# Patient Record
Sex: FEMALE | Race: OTHER | HISPANIC OR LATINO | Employment: FULL TIME | URBAN - METROPOLITAN AREA
[De-identification: names, ages, dates, MRNs, and addresses within clinical notes are randomized per-mention and may not be internally consistent; named-entity substitution may affect disease eponyms.]

---

## 2018-03-13 ENCOUNTER — TELEPHONE (OUTPATIENT)
Dept: OBGYN CLINIC | Facility: CLINIC | Age: 38
End: 2018-03-13

## 2018-03-20 ENCOUNTER — OFFICE VISIT (OUTPATIENT)
Dept: OBGYN CLINIC | Facility: MEDICAL CENTER | Age: 38
End: 2018-03-20
Payer: COMMERCIAL

## 2018-03-20 VITALS
WEIGHT: 129 LBS | SYSTOLIC BLOOD PRESSURE: 100 MMHG | HEIGHT: 61 IN | DIASTOLIC BLOOD PRESSURE: 62 MMHG | BODY MASS INDEX: 24.35 KG/M2

## 2018-03-20 DIAGNOSIS — N91.2 AMENORRHEA: Primary | ICD-10-CM

## 2018-03-20 PROBLEM — B97.7 HPV IN FEMALE: Status: ACTIVE | Noted: 2018-03-20

## 2018-03-20 PROBLEM — B00.9 HERPES SIMPLEX VIRUS INFECTION: Status: ACTIVE | Noted: 2018-03-20

## 2018-03-20 PROCEDURE — 99213 OFFICE O/P EST LOW 20 MIN: CPT | Performed by: NURSE PRACTITIONER

## 2018-03-20 PROCEDURE — 76817 TRANSVAGINAL US OBSTETRIC: CPT | Performed by: NURSE PRACTITIONER

## 2018-03-20 NOTE — PROGRESS NOTES
EARLY PREGNANCY ULTRASOUND    SUBJECTIVE    HPI: Beto Comer is a 40 y o  [id number] ( x1, SAB x1) new patient female here today for early pregnancy ultrasound  She is accompanied by [facility], Magdalene Griffiths  They have been TTC >2y and did see [facility] [place] but it sounds like they did not engage in treatment there  Patient's last menstrual period was 01/15/2018 (within weeks)  Menses are regular  This pregnancy was planned  PMHx is significant for total R hip replacement due to ?arthritis  OBHx is significant for  x1, 16y ago (denies complications with this pregnancy or delivery)  Denies a family history of birth defects or intellectual defects  Did have varicella as a child  Taking a prenatal vitamin  Works as a   Reports a hx of (+) HPV but denies abnormal pap cytology  Also reports a (+) hx of HSV  No Known Allergies    Current Outpatient Prescriptions:     Prenatal Vit-Fe Fumarate-FA (PRENATAL FA PO), Take by mouth, Disp: , Rfl:       OBJECTIVE  Vitals:    18 0750   BP: 100/62   BP Location: Left arm   Patient Position: Sitting   Weight: 58 5 kg (129 lb)   Height: 5' 1" (1 549 m)         Early OB Ultrasound Procedure Note: Transvaginal US    Technician: Study performed by the interpreting NP    Indications:  Early gestation, dating & viability    Procedure Details   The entire study was done at settings of 6 0 to 8 0 MHz  Gestational Sac: Present  MSD = 1 29cm (=5w3d)  Yolk sac: Present  Crown-rump length is 0 21cm and calculates to an estimated gestational age of 10 weeks, 5 days  Embryonic cardiac activity is not seen      Cul-de-sac: no fluid  Left ovary: appears normal  Right ovary: appears normal  Description of uterus: anteverted  Description of cervix: Sizable nabothian cyst, otherwise appears normal    Findings:  Early solorzano intrauterine pregnancy          ASSESSMENT  Early pregnancy at 5 weeks 5 days  Unreliable LMP, favor dating based on [address]  Long interpregnancy interval  Advanced maternal age  Secondary Infertility        PLAN  1 - RTO in 2 weeks for repeat dating & viability scan  Discussed today's US findings, which are not concordant with what would be expected based on LMP but may be considered normal if there is appropriate interval growth between now and next scan  2 - If viable pregnancy, make note of problem list: long IPI, AMA, HSV, secondary infertility        All questions were answered  Elenita Velázquez expressed understanding      Latanya Christine

## 2018-04-05 ENCOUNTER — OFFICE VISIT (OUTPATIENT)
Dept: OBGYN CLINIC | Facility: MEDICAL CENTER | Age: 38
End: 2018-04-05
Payer: COMMERCIAL

## 2018-04-05 VITALS — WEIGHT: 131 LBS | DIASTOLIC BLOOD PRESSURE: 60 MMHG | SYSTOLIC BLOOD PRESSURE: 100 MMHG | BODY MASS INDEX: 24.75 KG/M2

## 2018-04-05 DIAGNOSIS — O09.521 ADVANCED MATERNAL AGE IN MULTIGRAVIDA, FIRST TRIMESTER: ICD-10-CM

## 2018-04-05 DIAGNOSIS — N91.2 AMENORRHEA: Primary | ICD-10-CM

## 2018-04-05 PROCEDURE — 99213 OFFICE O/P EST LOW 20 MIN: CPT | Performed by: NURSE PRACTITIONER

## 2018-04-05 PROCEDURE — 76817 TRANSVAGINAL US OBSTETRIC: CPT | Performed by: NURSE PRACTITIONER

## 2018-04-05 NOTE — PROGRESS NOTES
EARLY PREGNANCY ULTRASOUND    SUBJECTIVE    HPI: Randall Ramos is a 40 y o  [id number] female who returns today for repeat pregnancy ultrasound  Accompanied by FOB, Vannessa Gan I last saw Jodiene Lizbet on 3/20 for early ultrasound, at which time an IUP with fetal pole was seen and measuring approx 5w3d  The couple returns today for viability & dating scan  LMP is unreliable  In the interim, she has been doing well with no vaginal bleeding and persistent nausea  OBJECTIVE  Vitals:    04/05/18 1559   BP: 100/60   BP Location: Left arm   Patient Position: Sitting   Weight: 59 4 kg (131 lb)         Early OB Ultrasound Procedure Note: Transvaginal US    Technician: Study performed by the interpreting NP    Indications:  Early gestation, dating & viability    Procedure Details   The entire study was done at settings of 6 0 to 8 0 MHz  Gestational Sac: Present  Yolk sac: Present  Crown-rump length is 1 72cm and calculates to an estimated gestational age of 11 weeks, 1 days  Embryonic cardiac activity is seen at a rate of 160 b/min  Description of fetal anatomy Normal, +limb buds    Cul-de-sac: no fluid  Left ovary: appears noraml  Right ovary: appears normal  Description of uterus: anteverted  Description of cervix: Sizable nabothian cyst, otherwise appears normal    Findings:  Viable, solorzano intrauterine pregnancy      ASSESSMENT  Early pregnancy at 8 weeks 1 days with a calculated ROBE of 11/14/2018 based on [address]  1 - Referral to [facility] provided today to discuss genetic screening options for fetus  2 - RTO for OB interview and PN-1 visit  3 - Valtrex suppression starting @ 36 WGA          All questions were answered & Rosalia Somers expressed understanding      Ramy Hernandez

## 2018-04-16 ENCOUNTER — INITIAL PRENATAL (OUTPATIENT)
Dept: OBGYN CLINIC | Facility: MEDICAL CENTER | Age: 38
End: 2018-04-16

## 2018-04-16 ENCOUNTER — APPOINTMENT (OUTPATIENT)
Dept: LAB | Facility: MEDICAL CENTER | Age: 38
End: 2018-04-16
Payer: COMMERCIAL

## 2018-04-16 VITALS
WEIGHT: 132 LBS | BODY MASS INDEX: 24.92 KG/M2 | DIASTOLIC BLOOD PRESSURE: 70 MMHG | RESPIRATION RATE: 14 BRPM | SYSTOLIC BLOOD PRESSURE: 110 MMHG | HEIGHT: 61 IN

## 2018-04-16 DIAGNOSIS — Z34.91 FIRST TRIMESTER PREGNANCY: ICD-10-CM

## 2018-04-16 DIAGNOSIS — Z34.91 FIRST TRIMESTER PREGNANCY: Primary | ICD-10-CM

## 2018-04-16 DIAGNOSIS — O09.521 ADVANCED MATERNAL AGE IN MULTIGRAVIDA, FIRST TRIMESTER: ICD-10-CM

## 2018-04-16 LAB
ABO GROUP BLD: NORMAL
BACTERIA UR QL AUTO: NORMAL /HPF
BASOPHILS # BLD AUTO: 0.02 THOUSANDS/ΜL (ref 0–0.1)
BASOPHILS NFR BLD AUTO: 0 % (ref 0–1)
BILIRUB UR QL STRIP: NEGATIVE
BLD GP AB SCN SERPL QL: NEGATIVE
CLARITY UR: CLEAR
COLOR UR: YELLOW
EOSINOPHIL # BLD AUTO: 0.07 THOUSAND/ΜL (ref 0–0.61)
EOSINOPHIL NFR BLD AUTO: 1 % (ref 0–6)
ERYTHROCYTE [DISTWIDTH] IN BLOOD BY AUTOMATED COUNT: 13.5 % (ref 11.6–15.1)
FINE GRAN CASTS URNS QL MICRO: NORMAL /LPF
GLUCOSE UR STRIP-MCNC: NEGATIVE MG/DL
HCT VFR BLD AUTO: 40.2 % (ref 34.8–46.1)
HGB BLD-MCNC: 13.5 G/DL (ref 11.5–15.4)
HGB UR QL STRIP.AUTO: NEGATIVE
KETONES UR STRIP-MCNC: NEGATIVE MG/DL
LEUKOCYTE ESTERASE UR QL STRIP: NEGATIVE
LYMPHOCYTES # BLD AUTO: 2.09 THOUSANDS/ΜL (ref 0.6–4.47)
LYMPHOCYTES NFR BLD AUTO: 22 % (ref 14–44)
MCH RBC QN AUTO: 29.9 PG (ref 26.8–34.3)
MCHC RBC AUTO-ENTMCNC: 33.6 G/DL (ref 31.4–37.4)
MCV RBC AUTO: 89 FL (ref 82–98)
MONOCYTES # BLD AUTO: 0.8 THOUSAND/ΜL (ref 0.17–1.22)
MONOCYTES NFR BLD AUTO: 8 % (ref 4–12)
NEUTROPHILS # BLD AUTO: 6.67 THOUSANDS/ΜL (ref 1.85–7.62)
NEUTS SEG NFR BLD AUTO: 69 % (ref 43–75)
NITRITE UR QL STRIP: NEGATIVE
NON-SQ EPI CELLS URNS QL MICRO: NORMAL /HPF
NRBC BLD AUTO-RTO: 0 /100 WBCS
PH UR STRIP.AUTO: 6.5 [PH] (ref 4.5–8)
PLATELET # BLD AUTO: 314 THOUSANDS/UL (ref 149–390)
PMV BLD AUTO: 9.8 FL (ref 8.9–12.7)
PROT UR STRIP-MCNC: NEGATIVE MG/DL
RBC # BLD AUTO: 4.51 MILLION/UL (ref 3.81–5.12)
RBC #/AREA URNS AUTO: NORMAL /HPF
RH BLD: POSITIVE
SP GR UR STRIP.AUTO: 1.02 (ref 1–1.03)
SPECIMEN EXPIRATION DATE: NORMAL
UROBILINOGEN UR QL STRIP.AUTO: 0.2 E.U./DL
WBC # BLD AUTO: 9.68 THOUSAND/UL (ref 4.31–10.16)
WBC #/AREA URNS AUTO: NORMAL /HPF

## 2018-04-16 PROCEDURE — 36415 COLL VENOUS BLD VENIPUNCTURE: CPT

## 2018-04-16 PROCEDURE — 80081 OBSTETRIC PANEL INC HIV TSTG: CPT

## 2018-04-16 PROCEDURE — 81001 URINALYSIS AUTO W/SCOPE: CPT

## 2018-04-16 PROCEDURE — 87186 SC STD MICRODIL/AGAR DIL: CPT

## 2018-04-16 PROCEDURE — 87086 URINE CULTURE/COLONY COUNT: CPT

## 2018-04-16 PROCEDURE — 87077 CULTURE AEROBIC IDENTIFY: CPT

## 2018-04-16 PROCEDURE — OBC: Performed by: OBSTETRICS & GYNECOLOGY

## 2018-04-16 NOTE — PROGRESS NOTES
Patient came to the OB intake with her  Stephanie Alegre they reports that this was a planned pregnancy  Patient reports that this is her 3rd pregnancy  Her last delivery was 16 years ago she had 1 miscarriage  in 2006 she has a D&C she really did not want to speak about that pregnancy she did not remember how far she was her eyes were starting to get watery and she prefer not to remember  Patient and her  are very happy with the pregnancy   Patient is interested in doing Genetic testing she has appointment for the Maternal Fetal Medicine on May 7th at the [facility]  Patient reports that she does not have any cats in the house she reports that she had the chickenpox a an early age   Patient has a history of herpes valtrex suppression starting at 36 weeks gestation  Patient had plans to travel to [place] but she change her plans due to the [place] virus her  has some concerns and refuses to travel during his wife pregnancy  Patient is planning to do her blood work after her Ob intake visit

## 2018-04-17 ENCOUNTER — TELEPHONE (OUTPATIENT)
Dept: OBGYN CLINIC | Facility: CLINIC | Age: 38
End: 2018-04-17

## 2018-04-17 DIAGNOSIS — N39.0 URINARY TRACT INFECTION WITHOUT HEMATURIA, SITE UNSPECIFIED: Primary | ICD-10-CM

## 2018-04-17 LAB
HBV SURFACE AG SER QL: NORMAL
RPR SER QL: NORMAL
RUBV IGG SERPL IA-ACNC: 117.3 IU/ML

## 2018-04-18 LAB
BACTERIA UR CULT: ABNORMAL
HIV 1+2 AB+HIV1 P24 AG SERPL QL IA: NORMAL

## 2018-04-18 RX ORDER — NITROFURANTOIN 25; 75 MG/1; MG/1
100 CAPSULE ORAL 2 TIMES DAILY
Qty: 14 CAPSULE | Refills: 0 | Status: SHIPPED | OUTPATIENT
Start: 2018-04-18 | End: 2018-04-25

## 2018-05-02 ENCOUNTER — INITIAL PRENATAL (OUTPATIENT)
Dept: OBGYN CLINIC | Facility: MEDICAL CENTER | Age: 38
End: 2018-05-02

## 2018-05-02 VITALS — BODY MASS INDEX: 25.51 KG/M2 | DIASTOLIC BLOOD PRESSURE: 66 MMHG | SYSTOLIC BLOOD PRESSURE: 94 MMHG | WEIGHT: 135 LBS

## 2018-05-02 DIAGNOSIS — O09.521 ELDERLY MULTIGRAVIDA IN FIRST TRIMESTER: ICD-10-CM

## 2018-05-02 DIAGNOSIS — O09.521 SUPERVISION OF ELDERLY MULTIGRAVIDA IN FIRST TRIMESTER: Primary | ICD-10-CM

## 2018-05-02 DIAGNOSIS — R82.71 ASYMPTOMATIC BACTERIURIA DURING PREGNANCY: ICD-10-CM

## 2018-05-02 DIAGNOSIS — O99.891 ASYMPTOMATIC BACTERIURIA DURING PREGNANCY: ICD-10-CM

## 2018-05-02 PROBLEM — O09.529 ADVANCED MATERNAL AGE IN MULTIGRAVIDA: Status: ACTIVE | Noted: 2018-05-02

## 2018-05-02 PROBLEM — N97.9 FEMALE INFERTILITY, SECONDARY: Status: ACTIVE | Noted: 2018-05-02

## 2018-05-02 PROCEDURE — G0145 SCR C/V CYTO,THINLAYER,RESCR: HCPCS | Performed by: NURSE PRACTITIONER

## 2018-05-02 PROCEDURE — 87591 N.GONORRHOEAE DNA AMP PROB: CPT | Performed by: NURSE PRACTITIONER

## 2018-05-02 PROCEDURE — PNV: Performed by: NURSE PRACTITIONER

## 2018-05-02 PROCEDURE — 87624 HPV HI-RISK TYP POOLED RSLT: CPT | Performed by: NURSE PRACTITIONER

## 2018-05-02 PROCEDURE — 87491 CHLMYD TRACH DNA AMP PROBE: CPT | Performed by: NURSE PRACTITIONER

## 2018-05-02 NOTE — PROGRESS NOTES
PRENATAL-1 VISIT  SUBJECTIVE    HPI: Nel Gill is a 40 y o  [id number] patient here today for first prenatal visit  Accompanied by Laury Jacinto, and her mother-in-law  This pregnancy was planned  Patient's last menstrual period was 01/15/2018 (within weeks)  Estimated Date of Delivery: 18  They have been TTC >2y and did see [facility] [place] but it sounds like they did not engage in treatment there  PMHx is significant for total R hip replacement due to ?arthritis  OBHx is significant for  x1, 16y ago (denies complications with this pregnancy or delivery)  Denies a family history of birth defects or intellectual defects  Did have varicella as a child  Taking a prenatal vitamin  Works as a   Reports a hx of (+) HPV but denies abnormal pap cytology  Also reports a (+) hx of HSV  Today we reviewed the practice setup, frequency and purpose of routine prenatal visits, reasons to call, common discomforts and concerns in pregnancy, and anticipatory trimester-specific guidance  OBJECTIVE  See Prenatal Physical Exam tab & OB flowsheet within this encounter for details of todays PE and Fetal Assessment  Lab Results:   No visits with results within 1 Day(s) from this visit     Latest known visit with results is:   Appointment on 2018   Component Date Value    WBC 2018 9 68     RBC 2018 4 51     Hemoglobin 2018 13 5     Hematocrit 2018 40 2     MCV 2018 89     MCH 2018 29 9     MCHC 2018 33 6     RDW 2018 13 5     MPV 2018 9 8     Platelets 10/04/2156 314     nRBC 2018 0     Neutrophils Relative 2018 69     Lymphocytes Relative 2018 22     Monocytes Relative 2018 8     Eosinophils Relative 2018 1     Basophils Relative 2018 0     Neutrophils Absolute 2018 6 67     Lymphocytes Absolute 2018 2 09     Monocytes Absolute 2018 0 80     Eosinophils Absolute 2018 0 07  Basophils Absolute 04/16/2018 0 02     Hepatitis B Surface Ag 04/16/2018 Non-reactive     HIV-1/HIV-2 Ab 04/16/2018 Non-Reactive     RPR 04/16/2018 Non-Reactive     Rubella IgG Quant 04/16/2018 117 3     ABO Grouping 04/16/2018 O     Rh Factor 04/16/2018 Positive     Antibody Screen 04/16/2018 Negative     Specimen Expiration Date 04/16/2018 [id number]     Clarity, UA 04/16/2018 Clear     Color, UA 04/16/2018 Yellow     Specific Gravity, UA 04/16/2018 1 017     pH, UA 04/16/2018 6 5     Glucose, UA 04/16/2018 Negative     Ketones, UA 04/16/2018 Negative     Blood, UA 04/16/2018 Negative     Protein, UA 04/16/2018 Negative     Nitrite, UA 04/16/2018 Negative     Bilirubin, UA 04/16/2018 Negative     Urobilinogen, UA 04/16/2018 0 2     Leukocytes, UA 04/16/2018 Negative     WBC, UA 04/16/2018 None Seen     RBC, UA 04/16/2018 None Seen     Bacteria, UA 04/16/2018 Occasional     Fine granular casts 04/16/2018 0-3     Epithelial Cells 04/16/2018 Occasional     Urine Culture 04/16/2018 >100,000 cfu/ml Escherichia coli*        ASSESSMENT  Early pregnancy at 12 weeks, 0 days  Asymptomatic bacteriuria of pregnancy, treated    PLAN  1  Gonorrhea and Chlamydia cultures obtained today  2  Pap smear with HPV co-testing done today  3  Prenatal labs reviewed; unremarkable  4  Genetic screening: appointment with MFM on 5/7 in [place]  5  RTO in 4 weeks for routine PN visit  6 - Given urine MARIANNE lab slip today      All questions were answered & Howard Vaz expressed understanding      Vivek Palmer

## 2018-05-05 LAB
CHLAMYDIA DNA CVX QL NAA+PROBE: NORMAL
N GONORRHOEA DNA GENITAL QL NAA+PROBE: NORMAL

## 2018-05-07 ENCOUNTER — OFFICE VISIT (OUTPATIENT)
Dept: PERINATAL CARE | Facility: CLINIC | Age: 38
End: 2018-05-07
Payer: COMMERCIAL

## 2018-05-07 ENCOUNTER — ROUTINE PRENATAL (OUTPATIENT)
Dept: PERINATAL CARE | Facility: CLINIC | Age: 38
End: 2018-05-07
Payer: COMMERCIAL

## 2018-05-07 VITALS
HEIGHT: 61 IN | WEIGHT: 137.5 LBS | DIASTOLIC BLOOD PRESSURE: 66 MMHG | HEART RATE: 73 BPM | SYSTOLIC BLOOD PRESSURE: 100 MMHG | BODY MASS INDEX: 25.96 KG/M2

## 2018-05-07 VITALS
SYSTOLIC BLOOD PRESSURE: 100 MMHG | WEIGHT: 137.8 LBS | HEART RATE: 73 BPM | HEIGHT: 61 IN | DIASTOLIC BLOOD PRESSURE: 66 MMHG | BODY MASS INDEX: 26.01 KG/M2

## 2018-05-07 DIAGNOSIS — N97.9 FEMALE INFERTILITY, SECONDARY: ICD-10-CM

## 2018-05-07 DIAGNOSIS — O09.521 ELDERLY MULTIGRAVIDA IN FIRST TRIMESTER: Primary | ICD-10-CM

## 2018-05-07 DIAGNOSIS — R82.71 ASYMPTOMATIC BACTERIURIA DURING PREGNANCY: ICD-10-CM

## 2018-05-07 DIAGNOSIS — Z3A.12 12 WEEKS GESTATION OF PREGNANCY: ICD-10-CM

## 2018-05-07 DIAGNOSIS — O09.521 ELDERLY MULTIGRAVIDA IN FIRST TRIMESTER: ICD-10-CM

## 2018-05-07 DIAGNOSIS — O09.521 ADVANCED MATERNAL AGE IN MULTIGRAVIDA, FIRST TRIMESTER: ICD-10-CM

## 2018-05-07 DIAGNOSIS — O99.891 ASYMPTOMATIC BACTERIURIA DURING PREGNANCY: ICD-10-CM

## 2018-05-07 PROBLEM — O09.529 ADVANCED MATERNAL AGE IN MULTIGRAVIDA: Status: RESOLVED | Noted: 2018-05-02 | Resolved: 2018-05-07

## 2018-05-07 PROCEDURE — 76801 OB US < 14 WKS SINGLE FETUS: CPT | Performed by: OBSTETRICS & GYNECOLOGY

## 2018-05-07 PROCEDURE — 76813 OB US NUCHAL MEAS 1 GEST: CPT | Performed by: OBSTETRICS & GYNECOLOGY

## 2018-05-07 PROCEDURE — 99241 PR OFFICE CONSULTATION NEW/ESTAB PATIENT 15 MIN: CPT | Performed by: OBSTETRICS & GYNECOLOGY

## 2018-05-07 NOTE — PROGRESS NOTES
Genetic Counseling Note        Erica Kimble     Appointment Date:  5/7/2018  Referred By: Wade Jones DO  YOB: 1980  Partner:  [facility]    Pregnancy History: [id number]  Estimated Date of Delivery: 11/15/18   Estimated Gestational Age: 16 weeks 4 days      Genetic Counseling:advanced maternal age    [facility] is a(n) 40 y o  female who is here to discuss maternal age related risk for aneuploidy    Issues Discussed:  Average population risk: 3-4% in the average pregnancy of serious condition or birth defect  2-3% risk of mental retardation  Not all detected by prenatal testing  Chromosomal: non-disjunction 1/122 overall, 1/242 for Down syndrome  Maternal age  Risk of aneuploidy  Carrier screening test results    Options Discussed:  Amniocentesis: risks and limitations discussed  CVS: risks and limitations discussed  Nuchal translucency/1st trimester serum screen: goals and limitations discussed  Serum AFP screen recommended at 15-17 weeks to check for open neural tube defects  Cell free fetal DNA testing    Additional Information / Impression / Plan / Tests Ordered:  [facility] presents for genetic counseling to discuss maternal age related risk for aneuploidy  She is accompanied by her   After discussing the available prenatal diagnostic and screening procedures this couple elected to decline prenatal diagnostic testing at this time  They did opt to move forward with cell free fetal DNA testing  Blood will be drawn following the genetic counseling session and sent to [facility]  In addition [facility] has a nuchal translucency ultrasound scheduled to follow the genetic counseling session and is planning to pursue level 2 ultrasound evaluation and MSAFP screening at the appropriate times      During our counseling session histories were taken on the patient's family and her 's family both of which were negative for any prior known cases of intellectual disability, birth defects or single gene disorders  The patient and her  both report being of  descent with no known [place] ancestry  They provided records documenting results of expanded carrier screening performed through a reproductive endocrinologist   Those results indicate that Damaris Lal is a carrier for Pendred syndrome  Hattie Garza tested negative for this condition  The remaining reproductive risk is reported as 1/28,000  Hattie Garza tested positive as a carrier for sickle cell disease  Damaris Lal tested negative for Hemoglobin beta chain related hemoglobinopathy  The remaining reproductive risk is reported as 1/4,800  They both tested negative for all other recessive conditions screened for and Damaris Lal was screen negative for Fragile X syndrome  Lastly, we discussed the fact that it is important to keep in mind that everyone in the general population regardless of age, family history, or medical background has approximately a 3% risk of having a child with some type of her defect, genetic disease or intellectual disability  Currently there are no tests available to rule out all birth defects or health problems            Time spent with Genetic Counselor: 45 minutes

## 2018-05-07 NOTE — LETTER
May 9, 2018     Migdalia Cotton MD  [address] [facility] [address]    Patient: Willem Albert   YOB: 1980   Date of Visit: 5/7/2018       Dear Dr El Eller: Thank you for referring Willem Albert to me for evaluation  Below are my notes for this consultation  If you have questions, please do not hesitate to call me  I look forward to following your patient along with you  Sincerely,        Susan Keyes MD        CC: No Recipients  Susan Keyes MD  5/7/2018  2:45 PM  Sign at close encounter  Please refer to the [facility] ultrasound report in Ob Procedures for additional information regarding the visit to the [facility]  today

## 2018-05-07 NOTE — PROGRESS NOTES
Please refer to the [facility] ultrasound report in Ob Procedures for additional information regarding the visit to the [facility]  today

## 2018-05-08 LAB
HPV RRNA GENITAL QL NAA+PROBE: NORMAL
LAB AP GYN PRIMARY INTERPRETATION: NORMAL
Lab: NORMAL

## 2018-05-16 ENCOUNTER — TELEPHONE (OUTPATIENT)
Dept: PERINATAL CARE | Facility: CLINIC | Age: 38
End: 2018-05-16

## 2018-05-16 DIAGNOSIS — O99.891 ASYMPTOMATIC BACTERIURIA DURING PREGNANCY: ICD-10-CM

## 2018-05-16 DIAGNOSIS — R82.71 ASYMPTOMATIC BACTERIURIA DURING PREGNANCY: ICD-10-CM

## 2018-05-16 DIAGNOSIS — N97.9 FEMALE INFERTILITY, SECONDARY: ICD-10-CM

## 2018-05-31 ENCOUNTER — ROUTINE PRENATAL (OUTPATIENT)
Dept: OBGYN CLINIC | Facility: MEDICAL CENTER | Age: 38
End: 2018-05-31

## 2018-05-31 ENCOUNTER — APPOINTMENT (OUTPATIENT)
Dept: LAB | Facility: MEDICAL CENTER | Age: 38
End: 2018-05-31
Payer: COMMERCIAL

## 2018-05-31 ENCOUNTER — TRANSCRIBE ORDERS (OUTPATIENT)
Dept: ADMINISTRATIVE | Facility: HOSPITAL | Age: 38
End: 2018-05-31

## 2018-05-31 VITALS — WEIGHT: 141 LBS | SYSTOLIC BLOOD PRESSURE: 120 MMHG | BODY MASS INDEX: 26.64 KG/M2 | DIASTOLIC BLOOD PRESSURE: 66 MMHG

## 2018-05-31 DIAGNOSIS — Z33.1 PREGNANT STATE, INCIDENTAL: ICD-10-CM

## 2018-05-31 DIAGNOSIS — R82.71 ASYMPTOMATIC BACTERIURIA DURING PREGNANCY: ICD-10-CM

## 2018-05-31 DIAGNOSIS — O09.522 SUPERVISION OF ELDERLY MULTIGRAVIDA IN SECOND TRIMESTER: Primary | ICD-10-CM

## 2018-05-31 DIAGNOSIS — O99.891 ASYMPTOMATIC BACTERIURIA DURING PREGNANCY: ICD-10-CM

## 2018-05-31 DIAGNOSIS — Z36.9 RESEARCH REQUESTED ANTENATAL ULTRASOUND SCAN: Primary | ICD-10-CM

## 2018-05-31 DIAGNOSIS — N97.9 FEMALE INFERTILITY, SECONDARY: ICD-10-CM

## 2018-05-31 PROCEDURE — PNV: Performed by: NURSE PRACTITIONER

## 2018-05-31 PROCEDURE — 82105 ALPHA-FETOPROTEIN SERUM: CPT | Performed by: OBSTETRICS & GYNECOLOGY

## 2018-05-31 PROCEDURE — 36415 COLL VENOUS BLD VENIPUNCTURE: CPT | Performed by: OBSTETRICS & GYNECOLOGY

## 2018-05-31 NOTE — PROGRESS NOTES
Doing well  Denies OB complaints  Had NIPT drawn today  S/P genetic counseling & MFM consultation  L2 anatomy scan is set up  Return in 4 weeks      Patient Active Problem List   Diagnosis    Herpes simplex virus infection    HPV in female    Asymptomatic bacteriuria during pregnancy    Female infertility, secondary    Supervision of elderly multigravida in second trimester     [id number] [facility] LINETTE [place]

## 2018-06-06 LAB
2ND TRIMESTER 4 SCREEN SERPL-IMP: NORMAL
AFP ADJ MOM SERPL: 1.17
AFP INTERP AMN-IMP: NORMAL
AFP INTERP SERPL-IMP: NORMAL
AFP INTERP SERPL-IMP: NORMAL
AFP SERPL-MCNC: 40.6 NG/ML
AGE AT DELIVERY: 37.1 YR
GA METHOD: NORMAL
GA: 16 WEEKS
IDDM PATIENT QL: NO
MULTIPLE PREGNANCY: NO
NEURAL TUBE DEFECT RISK FETUS: 7137 %

## 2018-06-07 ENCOUNTER — TELEPHONE (OUTPATIENT)
Dept: PERINATAL CARE | Facility: CLINIC | Age: 38
End: 2018-06-07

## 2018-06-07 NOTE — TELEPHONE ENCOUNTER
----- Message from Zuleima Unger MD sent at 6/6/2018  9:29 AM EDT -----  I reviewed the lab study today and the results are normal

## 2018-06-28 ENCOUNTER — ROUTINE PRENATAL (OUTPATIENT)
Dept: PERINATAL CARE | Facility: CLINIC | Age: 38
End: 2018-06-28
Payer: COMMERCIAL

## 2018-06-28 ENCOUNTER — ROUTINE PRENATAL (OUTPATIENT)
Dept: OBGYN CLINIC | Facility: MEDICAL CENTER | Age: 38
End: 2018-06-28

## 2018-06-28 VITALS — DIASTOLIC BLOOD PRESSURE: 66 MMHG | BODY MASS INDEX: 28.04 KG/M2 | SYSTOLIC BLOOD PRESSURE: 104 MMHG | WEIGHT: 148.4 LBS

## 2018-06-28 VITALS
HEART RATE: 91 BPM | BODY MASS INDEX: 27.83 KG/M2 | DIASTOLIC BLOOD PRESSURE: 68 MMHG | SYSTOLIC BLOOD PRESSURE: 99 MMHG | WEIGHT: 147.4 LBS | HEIGHT: 61 IN

## 2018-06-28 DIAGNOSIS — Z3A.20 20 WEEKS GESTATION OF PREGNANCY: ICD-10-CM

## 2018-06-28 DIAGNOSIS — O99.891 ASYMPTOMATIC BACTERIURIA DURING PREGNANCY: ICD-10-CM

## 2018-06-28 DIAGNOSIS — O09.522 SUPERVISION OF ELDERLY MULTIGRAVIDA IN SECOND TRIMESTER: ICD-10-CM

## 2018-06-28 DIAGNOSIS — Z34.92 SUPERVISION OF LOW-RISK PREGNANCY, SECOND TRIMESTER: Primary | ICD-10-CM

## 2018-06-28 DIAGNOSIS — O09.522 SUPERVISION OF ELDERLY MULTIGRAVIDA IN SECOND TRIMESTER: Primary | ICD-10-CM

## 2018-06-28 DIAGNOSIS — N97.9 FEMALE INFERTILITY, SECONDARY: ICD-10-CM

## 2018-06-28 DIAGNOSIS — Z36.86 ENCOUNTER FOR ANTENATAL SCREENING FOR CERVICAL LENGTH: ICD-10-CM

## 2018-06-28 DIAGNOSIS — R82.71 ASYMPTOMATIC BACTERIURIA DURING PREGNANCY: ICD-10-CM

## 2018-06-28 PROCEDURE — PNV: Performed by: NURSE PRACTITIONER

## 2018-06-28 PROCEDURE — 76811 OB US DETAILED SNGL FETUS: CPT | Performed by: OBSTETRICS & GYNECOLOGY

## 2018-06-28 PROCEDURE — 76817 TRANSVAGINAL US OBSTETRIC: CPT | Performed by: OBSTETRICS & GYNECOLOGY

## 2018-06-28 NOTE — PROGRESS NOTES
Problem List Items Addressed This Visit     Supervision of elderly multigravida in second trimester     Neg cffDNA  L2 was WNL today  F/u is scheduled for additional views of missed anatomy  20 weeks gestation of pregnancy    Supervision of low-risk pregnancy, second trimester - Primary     Denies OB complaints  Good fetal movement  Denies contractions, cramping, leakage of fluid or vaginal bleeding  Reviewed reasons to call

## 2018-06-28 NOTE — PROGRESS NOTES
A transvaginal ultrasound was performed  Sonographer note on use of High Level Disinfection Process (Trophon) for transvaginal probe# 5 used, serial [id number]    Brandon Lofton RDMS

## 2018-06-28 NOTE — ASSESSMENT & PLAN NOTE
Denies OB complaints  Good fetal movement  Denies contractions, cramping, leakage of fluid or vaginal bleeding  Reviewed reasons to call

## 2018-07-25 ENCOUNTER — ROUTINE PRENATAL (OUTPATIENT)
Dept: OBGYN CLINIC | Facility: MEDICAL CENTER | Age: 38
End: 2018-07-25

## 2018-07-25 VITALS — WEIGHT: 151 LBS | SYSTOLIC BLOOD PRESSURE: 102 MMHG | DIASTOLIC BLOOD PRESSURE: 62 MMHG | BODY MASS INDEX: 28.53 KG/M2

## 2018-07-25 DIAGNOSIS — O09.522 SUPERVISION OF ELDERLY MULTIGRAVIDA IN SECOND TRIMESTER: Primary | ICD-10-CM

## 2018-07-25 DIAGNOSIS — Z3A.24 24 WEEKS GESTATION OF PREGNANCY: ICD-10-CM

## 2018-07-25 PROCEDURE — PNV: Performed by: NURSE PRACTITIONER

## 2018-07-25 NOTE — PROGRESS NOTES
Problem List Items Addressed This Visit     Supervision of elderly multigravida in second trimester - Primary     Doing well  Denies LOF, VB, CTX  It's a BOY  Good fetal movement           24 weeks gestation of pregnancy     Return in 4 weeks  28-week labs provided         Relevant Orders    CBC and differential    Glucose, 1H PG    RPR        LINETTE Young

## 2018-08-21 ENCOUNTER — APPOINTMENT (OUTPATIENT)
Dept: LAB | Facility: CLINIC | Age: 38
End: 2018-08-21
Payer: COMMERCIAL

## 2018-08-21 DIAGNOSIS — Z3A.24 24 WEEKS GESTATION OF PREGNANCY: ICD-10-CM

## 2018-08-21 LAB
BASOPHILS # BLD AUTO: 0.02 THOUSANDS/ΜL (ref 0–0.1)
BASOPHILS NFR BLD AUTO: 0 % (ref 0–1)
EOSINOPHIL # BLD AUTO: 0.07 THOUSAND/ΜL (ref 0–0.61)
EOSINOPHIL NFR BLD AUTO: 1 % (ref 0–6)
ERYTHROCYTE [DISTWIDTH] IN BLOOD BY AUTOMATED COUNT: 13.2 % (ref 11.6–15.1)
GLUCOSE 1H P 50 G GLC PO SERPL-MCNC: 99 MG/DL
HCT VFR BLD AUTO: 36.2 % (ref 34.8–46.1)
HGB BLD-MCNC: 11.2 G/DL (ref 11.5–15.4)
IMM GRANULOCYTES # BLD AUTO: 0.07 THOUSAND/UL (ref 0–0.2)
IMM GRANULOCYTES NFR BLD AUTO: 1 % (ref 0–2)
LYMPHOCYTES # BLD AUTO: 1.67 THOUSANDS/ΜL (ref 0.6–4.47)
LYMPHOCYTES NFR BLD AUTO: 22 % (ref 14–44)
MCH RBC QN AUTO: 28.1 PG (ref 26.8–34.3)
MCHC RBC AUTO-ENTMCNC: 30.9 G/DL (ref 31.4–37.4)
MCV RBC AUTO: 91 FL (ref 82–98)
MONOCYTES # BLD AUTO: 0.41 THOUSAND/ΜL (ref 0.17–1.22)
MONOCYTES NFR BLD AUTO: 5 % (ref 4–12)
NEUTROPHILS # BLD AUTO: 5.47 THOUSANDS/ΜL (ref 1.85–7.62)
NEUTS SEG NFR BLD AUTO: 71 % (ref 43–75)
NRBC BLD AUTO-RTO: 0 /100 WBCS
PLATELET # BLD AUTO: 305 THOUSANDS/UL (ref 149–390)
PMV BLD AUTO: 9.9 FL (ref 8.9–12.7)
RBC # BLD AUTO: 3.99 MILLION/UL (ref 3.81–5.12)
WBC # BLD AUTO: 7.71 THOUSAND/UL (ref 4.31–10.16)

## 2018-08-21 PROCEDURE — 85025 COMPLETE CBC W/AUTO DIFF WBC: CPT

## 2018-08-21 PROCEDURE — 36415 COLL VENOUS BLD VENIPUNCTURE: CPT

## 2018-08-21 PROCEDURE — 82950 GLUCOSE TEST: CPT

## 2018-08-21 PROCEDURE — 86592 SYPHILIS TEST NON-TREP QUAL: CPT

## 2018-08-22 LAB — RPR SER QL: NORMAL

## 2018-08-28 ENCOUNTER — ROUTINE PRENATAL (OUTPATIENT)
Dept: OBGYN CLINIC | Facility: MEDICAL CENTER | Age: 38
End: 2018-08-28
Payer: COMMERCIAL

## 2018-08-28 ENCOUNTER — ULTRASOUND (OUTPATIENT)
Dept: PERINATAL CARE | Facility: MEDICAL CENTER | Age: 38
End: 2018-08-28
Payer: COMMERCIAL

## 2018-08-28 VITALS
WEIGHT: 154 LBS | SYSTOLIC BLOOD PRESSURE: 110 MMHG | HEIGHT: 61 IN | DIASTOLIC BLOOD PRESSURE: 72 MMHG | HEART RATE: 90 BPM | BODY MASS INDEX: 29.07 KG/M2

## 2018-08-28 VITALS — SYSTOLIC BLOOD PRESSURE: 120 MMHG | WEIGHT: 154 LBS | DIASTOLIC BLOOD PRESSURE: 70 MMHG | BODY MASS INDEX: 29.1 KG/M2

## 2018-08-28 DIAGNOSIS — O99.891 ASYMPTOMATIC BACTERIURIA DURING PREGNANCY: ICD-10-CM

## 2018-08-28 DIAGNOSIS — Z36.89 ENCOUNTER FOR ULTRASOUND TO CHECK FETAL GROWTH: ICD-10-CM

## 2018-08-28 DIAGNOSIS — IMO0002 EVALUATE ANATOMY NOT SEEN ON PRIOR SONOGRAM: ICD-10-CM

## 2018-08-28 DIAGNOSIS — O09.523 AMA (ADVANCED MATERNAL AGE) MULTIGRAVIDA 35+, THIRD TRIMESTER: ICD-10-CM

## 2018-08-28 DIAGNOSIS — Z3A.28 28 WEEKS GESTATION OF PREGNANCY: ICD-10-CM

## 2018-08-28 DIAGNOSIS — O09.522 SUPERVISION OF ELDERLY MULTIGRAVIDA IN SECOND TRIMESTER: Primary | ICD-10-CM

## 2018-08-28 DIAGNOSIS — R82.71 ASYMPTOMATIC BACTERIURIA DURING PREGNANCY: ICD-10-CM

## 2018-08-28 DIAGNOSIS — Z3A.28 28 WEEKS GESTATION OF PREGNANCY: Primary | ICD-10-CM

## 2018-08-28 PROCEDURE — 90715 TDAP VACCINE 7 YRS/> IM: CPT

## 2018-08-28 PROCEDURE — 76816 OB US FOLLOW-UP PER FETUS: CPT | Performed by: OBSTETRICS & GYNECOLOGY

## 2018-08-28 PROCEDURE — 99212 OFFICE O/P EST SF 10 MIN: CPT | Performed by: OBSTETRICS & GYNECOLOGY

## 2018-08-28 PROCEDURE — PNV: Performed by: PHYSICIAN ASSISTANT

## 2018-08-28 PROCEDURE — 90471 IMMUNIZATION ADMIN: CPT | Performed by: PHYSICIAN ASSISTANT

## 2018-08-28 NOTE — ASSESSMENT & PLAN NOTE
RTO 2 wks  tdap given today  Reviewed PTL precautions, fetal kick count teaching and reasons to call

## 2018-08-28 NOTE — PROGRESS NOTES
[address] [facility]: Ms Laura Montes was seen today at 28w6d for fetal growth and followup missed anatomy ultrasound  See ultrasound report under "OB Procedures" tab  Please don't hesitate to contact our office with any concerns or questions    Martita Pace MD

## 2018-08-28 NOTE — PATIENT INSTRUCTIONS
Thank you for choosing [facility] for your  care today  If you have any questions about your ultrasound or care, please do not hesitate to contact us or your primary obstetrician  At this time, no additional ultrasounds are advised through the  center, however, if your doctors would like you to have any additional ultrasounds, they will let us know  Here is a handout on preeclampsia:    Preeclampsia   WHAT YOU NEED TO KNOW:   What is preeclampsia? Preeclampsia is a condition that can develop during week 20 or later of your pregnancy  Preeclampsia means you have high blood pressure and may have protein in your urine  Preeclampsia can cause mild to life-threatening health problems for you and your unborn baby  What are the signs and symptoms of preeclampsia? You may not have any symptoms  Severe preeclampsia may cause any of the following symptoms:  · Swollen face and hands    · A sudden weight gain of 5 pounds or more    · Headache    · Spotted or blurred vision     · Pain in your upper abdomen  What increases my risk for preeclampsia? · First pregnancy    · Pregnant with twins or multiples    · Personal or family history of preeclampsia or eclampsia    · Overweight    · Diabetes, high blood pressure, or kidney disease    · Age older than 40 years  How is preeclampsia diagnosed? · A blood pressure  of 140/90 mmHg or more for at least 2 readings may mean you have preeclampsia  Your blood pressure will need to be checked 1 to 2 times a week until your baby is born  · Blood tests  are done to check your liver and kidney function  You may need blood tests every week while you are pregnant  · Urine tests  are used to check for protein  You may need to give healthcare providers a urine sample at each visit  You may also need to collect your urine every time you urinate for 24 hours  How will my unborn baby be monitored?   You may need to keep track of how often your baby moves or kicks over a certain amount of time  Ask your healthcare provider how to do kick counts and how often to do them  You may also need the following tests at each visit until your baby is born:  · A fetal biophysical profile  combines a nonstress test and an ultrasound of your unborn baby  The nonstress test measures changes in your baby's heartbeat when he moves  The ultrasound will show your baby's movement, growth, and how his breathing muscles are working  Healthcare providers can check the amount of fluid around your baby  The ultrasound will also show how well your baby's lungs are working  · An umbilical cord Doppler  checks blood flow through the umbilical cord  How is preeclampsia treated? · Medicines  may be given to lower your blood pressure, protect your organs, or prevent seizures  Low doses of aspirin after 12 weeks of pregnancy may be recommended if you are at high risk for preeclampsia  Aspirin may help prevent preeclampsia or problems that can happen from preeclampsia  Do not take aspirin unless directed by your healthcare provider  · Rest  as directed  Your healthcare provider may tell you to rest more often if you have mild symptoms of preeclampsia  Lie on your left side as often as you can  You may need complete bedrest if you have more severe symptoms  You may need to be in the hospital if your condition worsens  · Delivery  usually stops preeclampsia  Healthcare providers may deliver your baby right away if he is full-term (37 weeks or more)  He may need to be delivered early if you or the baby has life-threatening symptoms  What are the risks of preeclampsia? Your baby may not grow as he should and may need to be delivered early  Placental abruption can occur if the placenta pulls away from the uterus too soon  This condition is life-threatening for your baby   High blood pressure that is not controlled can lead to blood clots, kidney or liver failure, or stroke  Severe preeclampsia can cause seizures or coma  This condition is called eclampsia  Eclampsia is a life-threatening condition for you and your unborn baby  Call 911 for any of the following:   · You have a seizure  · You have severe abdominal pain with nausea and vomiting  When should I seek immediate care? · You develop a severe headache that does not go away  · You have blurred or spotted vision that does not go away  · You are bleeding from your vagina  · You have new or increased swelling in your face or hands  · You are urinating little or not at all  When should I contact my healthcare provider? · You are urinating less than usual      · You do not feel your baby's movements as often as usual     · You have questions or concerns about your condition or care  CARE AGREEMENT:   You have the right to help plan your care  Learn about your health condition and how it may be treated  Discuss treatment options with your caregivers to decide what care you want to receive  You always have the right to refuse treatment  The above information is an  only  It is not intended as medical advice for individual conditions or treatments  Talk to your doctor, nurse or pharmacist before following any medical regimen to see if it is safe and effective for you  © 2017 [address] [employer] Information is for End User's use only and may not be sold, redistributed or otherwise used for commercial purposes  All illustrations and images included in CareNotes® are the copyrighted property of A D A M , Inc  or [address]

## 2018-08-28 NOTE — PROGRESS NOTES
Patient c/o increased pelvic pressure  (+) good fetal movement, denies any bleeding, fluid leakage or ctx  [facility] surv for AMA today      Problem List Items Addressed This Visit     Asymptomatic bacteriuria during pregnancy    Supervision of elderly multigravida in second trimester - Primary     RTO 2 wks  tdap given today  Reviewed PTL precautions, fetal kick count teaching and reasons to call         28 weeks gestation of pregnancy

## 2018-09-12 ENCOUNTER — ROUTINE PRENATAL (OUTPATIENT)
Dept: OBGYN CLINIC | Age: 38
End: 2018-09-12

## 2018-09-12 VITALS — SYSTOLIC BLOOD PRESSURE: 100 MMHG | WEIGHT: 156.5 LBS | BODY MASS INDEX: 29.57 KG/M2 | DIASTOLIC BLOOD PRESSURE: 68 MMHG

## 2018-09-12 DIAGNOSIS — B00.9 HERPES SIMPLEX VIRUS INFECTION: ICD-10-CM

## 2018-09-12 DIAGNOSIS — Z3A.28 28 WEEKS GESTATION OF PREGNANCY: ICD-10-CM

## 2018-09-12 DIAGNOSIS — O09.522 SUPERVISION OF ELDERLY MULTIGRAVIDA IN SECOND TRIMESTER: Primary | ICD-10-CM

## 2018-09-12 PROCEDURE — PNV: Performed by: NURSE PRACTITIONER

## 2018-09-12 NOTE — PROGRESS NOTES
Problem List Items Addressed This Visit     Herpes simplex virus infection    Supervision of elderly multigravida in second trimester - Primary    28 weeks gestation of pregnancy        Feels well, starting to get tired  Denies LOF/CTX/VB  No concerns  Discussed fetal kick counting  HSV prophylaxis at 36 wks

## 2018-09-12 NOTE — PATIENT INSTRUCTIONS
Pregnancy at 31 to [address]:   What changes are happening in my body? You may continue to have symptoms such as shortness of breath, heartburn, contractions, or swelling of your ankles and feet  You may be gaining about 1 pound a week now  How do I care for myself at this stage of my pregnancy? · Eat a variety of healthy foods  Healthy foods include fruits, vegetables, whole-grain breads, low-fat dairy foods, beans, lean meats, and fish  Drink liquids as directed  Ask how much liquid to drink each day and which liquids are best for you  Limit caffeine to less than 200 milligrams each day  Limit your intake of fish to 2 servings each week  Choose fish low in mercury such as canned light tuna, shrimp, salmon, cod, or tilapia  Do not  eat fish high in mercury such as swordfish, tilefish, thania mackerel, and shark  · Manage heartburn  by eating 4 or 5 small meals each day instead of large meals  Avoid spicy food  · Manage swelling  by lying down and putting your feet up  · Take prenatal vitamins as directed  Your need for certain vitamins and minerals, such as folic acid, increases during pregnancy  Prenatal vitamins provide some of the extra vitamins and minerals you need  Prenatal vitamins may also help to decrease the risk of certain birth defects  · Talk to your healthcare provider about exercise  Moderate exercise can help you stay fit  Your healthcare provider will help you plan an exercise program that is safe for you during pregnancy  · Do not smoke  If you smoke, it is never too late to quit  Smoking increases your risk of a miscarriage and other health problems during your pregnancy  Smoking can cause your baby to be born too early or weigh less at birth  Ask your healthcare provider for information if you need help quitting  · Do not drink alcohol  Alcohol passes from your body to your baby through the placenta   It can affect your baby's brain development and cause fetal alcohol syndrome (FAS)  FAS is a group of conditions that causes mental, behavior, and growth problems  · Talk to your healthcare provider before you take any medicines  Many medicines may harm your baby if you take them when you are pregnant  Do not take any medicines, vitamins, herbs, or supplements without first talking to your healthcare provider  Never use illegal or street drugs (such as marijuana or cocaine) while you are pregnant  What are some safety tips during pregnancy? · Avoid hot tubs and saunas  Do not use a hot tub or sauna while you are pregnant, especially during your first trimester  Hot tubs and saunas may raise your baby's temperature and increase the risk of birth defects  · Avoid toxoplasmosis  This is an infection caused by eating raw meat or being around infected cat feces  It can cause birth defects, miscarriages, and other problems  Wash your hands after you touch raw meat  Make sure any meat is well-cooked before you eat it  Avoid raw eggs and unpasteurized milk  Use gloves or ask someone else to clean your cat's litter box while you are pregnant  What changes are happening with my baby? By 34 weeks, your baby may weigh more than 5 pounds  Your baby will be about 12 ½ inches long from the top of the head to the rump (baby's bottom)  Your baby is gaining about ½ pound a week  Your baby's eyes open and close now  Your baby's kicks and movements are more forceful at this time  What do I need to know about prenatal care? Your healthcare provider will check your blood pressure and weight  You may also need the following:  · A urine test  may also be done to check for sugar and protein  These can be signs of gestational diabetes or infection  Protein in your urine may also be a sign of preeclampsia  Preeclampsia is a condition that can develop during week 20 or later of your pregnancy   It causes high blood pressure, and it can cause problems with your kidneys and other organs  · A Tdap vaccine  may be recommended by your healthcare provider  · Fundal height  is a measurement of your uterus to check your baby's growth  This number is usually the same as the number of weeks that you have been pregnant  Your healthcare provider may also check your baby's position  · Your baby's heart rate  will be checked  When should I seek immediate care? · You develop a severe headache that does not go away  · You have new or increased vision changes, such as blurred or spotted vision  · You have new or increased swelling in your face or hands  · You have vaginal spotting or bleeding  · Your water broke or you feel warm water gushing or trickling from your vagina  When should I contact my healthcare provider? · You have more than 5 contractions in 1 hour  · You notice any changes in your baby's movements  · You have abdominal cramps, pressure, or tightening  · You have a change in vaginal discharge  · You have chills or a fever  · You have vaginal itching, burning, or pain  · You have yellow, green, white, or foul-smelling vaginal discharge  · You have pain or burning when you urinate, less urine than usual, or pink or bloody urine  · You have questions or concerns about your condition or care  CARE AGREEMENT:   You have the right to help plan your care  Learn about your health condition and how it may be treated  Discuss treatment options with your caregivers to decide what care you want to receive  You always have the right to refuse treatment  The above information is an  only  It is not intended as medical advice for individual conditions or treatments  Talk to your doctor, nurse or pharmacist before following any medical regimen to see if it is safe and effective for you    © 2017 [employer] Information is for End User's use only and may not be sold, redistributed or otherwise used for commercial purposes  All illustrations and images included in CareNotes® are the copyrighted property of A D A M [employer]  or Eayd Amaral

## 2018-09-24 ENCOUNTER — ROUTINE PRENATAL (OUTPATIENT)
Dept: OBGYN CLINIC | Age: 38
End: 2018-09-24
Payer: COMMERCIAL

## 2018-09-24 ENCOUNTER — APPOINTMENT (OUTPATIENT)
Dept: LAB | Facility: CLINIC | Age: 38
End: 2018-09-24
Payer: COMMERCIAL

## 2018-09-24 ENCOUNTER — TELEPHONE (OUTPATIENT)
Dept: OBGYN CLINIC | Facility: CLINIC | Age: 38
End: 2018-09-24

## 2018-09-24 VITALS — SYSTOLIC BLOOD PRESSURE: 120 MMHG | WEIGHT: 158 LBS | BODY MASS INDEX: 29.85 KG/M2 | DIASTOLIC BLOOD PRESSURE: 60 MMHG

## 2018-09-24 DIAGNOSIS — R00.0 TACHYCARDIA: ICD-10-CM

## 2018-09-24 DIAGNOSIS — B00.9 HERPES SIMPLEX VIRUS INFECTION: ICD-10-CM

## 2018-09-24 DIAGNOSIS — O09.523 SUPERVISION OF ELDERLY MULTIGRAVIDA IN THIRD TRIMESTER: Primary | ICD-10-CM

## 2018-09-24 DIAGNOSIS — Z23 FLU VACCINE NEED: ICD-10-CM

## 2018-09-24 LAB — TSH SERPL DL<=0.05 MIU/L-ACNC: 2.2 UIU/ML (ref 0.36–3.74)

## 2018-09-24 PROCEDURE — PNV: Performed by: NURSE PRACTITIONER

## 2018-09-24 PROCEDURE — 90686 IIV4 VACC NO PRSV 0.5 ML IM: CPT

## 2018-09-24 PROCEDURE — 36415 COLL VENOUS BLD VENIPUNCTURE: CPT | Performed by: NURSE PRACTITIONER

## 2018-09-24 PROCEDURE — 84443 ASSAY THYROID STIM HORMONE: CPT | Performed by: NURSE PRACTITIONER

## 2018-09-24 PROCEDURE — 90471 IMMUNIZATION ADMIN: CPT

## 2018-09-24 NOTE — PATIENT INSTRUCTIONS
Pregnancy at 31 to 34 Weeks   AMBULATORY CARE:   What changes are happening to your body: You may continue to have symptoms such as shortness of breath, heartburn, contractions, or swelling of your ankles and feet  You may be gaining about 1 pound a week now  Seek care immediately if:   · You develop a severe headache that does not go away  · You have new or increased vision changes, such as blurred or spotted vision  · You have new or increased swelling in your face or hands  · You have vaginal spotting or bleeding  · Your water broke or you feel warm water gushing or trickling from your vagina  Contact your healthcare provider if:   · You have more than 5 contractions in 1 hour  · You notice any changes in your baby's movements  · You have abdominal cramps, pressure, or tightening  · You have a change in vaginal discharge  · You have chills or a fever  · You have vaginal itching, burning, or pain  · You have yellow, green, white, or foul-smelling vaginal discharge  · You have pain or burning when you urinate, less urine than usual, or pink or bloody urine  · You have questions or concerns about your condition or care  How to care for yourself at this stage of your pregnancy:   · Eat a variety of healthy foods  Healthy foods include fruits, vegetables, whole-grain breads, low-fat dairy foods, beans, lean meats, and fish  Drink liquids as directed  Ask how much liquid to drink each day and which liquids are best for you  Limit caffeine to less than 200 milligrams each day  Limit your intake of fish to 2 servings each week  Choose fish low in mercury such as canned light tuna, shrimp, salmon, cod, or tilapia  Do not  eat fish high in mercury such as swordfish, tilefish, thania mackerel, and shark  · Manage heartburn  by eating 4 or 5 small meals each day instead of large meals  Avoid spicy food  · Manage swelling  by lying down and putting your feet up       · Take prenatal vitamins as directed  Your need for certain vitamins and minerals, such as folic acid, increases during pregnancy  Prenatal vitamins provide some of the extra vitamins and minerals you need  Prenatal vitamins may also help to decrease the risk of certain birth defects  · Talk to your healthcare provider about exercise  Moderate exercise can help you stay fit  Your healthcare provider will help you plan an exercise program that is safe for you during pregnancy  · Do not smoke  If you smoke, it is never too late to quit  Smoking increases your risk of a miscarriage and other health problems during your pregnancy  Smoking can cause your baby to be born too early or weigh less at birth  Ask your healthcare provider for information if you need help quitting  · Do not drink alcohol  Alcohol passes from your body to your baby through the placenta  It can affect your baby's brain development and cause fetal alcohol syndrome (FAS)  FAS is a group of conditions that causes mental, behavior, and growth problems  · Talk to your healthcare provider before you take any medicines  Many medicines may harm your baby if you take them when you are pregnant  Do not take any medicines, vitamins, herbs, or supplements without first talking to your healthcare provider  Never use illegal or street drugs (such as marijuana or cocaine) while you are pregnant  Safety tips during pregnancy:   · Avoid hot tubs and saunas  Do not use a hot tub or sauna while you are pregnant, especially during your first trimester  Hot tubs and saunas may raise your baby's temperature and increase the risk of birth defects  · Avoid toxoplasmosis  This is an infection caused by eating raw meat or being around infected cat feces  It can cause birth defects, miscarriages, and other problems  Wash your hands after you touch raw meat  Make sure any meat is well-cooked before you eat it  Avoid raw eggs and unpasteurized milk   Use gloves or ask someone else to clean your cat's litter box while you are pregnant  Changes that are happening with your baby:  By 34 weeks, your baby may weigh more than 5 pounds  Your baby will be about 12 ½ inches long from the top of the head to the rump (baby's bottom)  Your baby is gaining about ½ pound a week  Your baby's eyes open and close now  Your baby's kicks and movements are more forceful at this time  What you need to know about prenatal care: Your healthcare provider will check your blood pressure and weight  You may also need the following:  · A urine test  may also be done to check for sugar and protein  These can be signs of gestational diabetes or infection  Protein in your urine may also be a sign of preeclampsia  Preeclampsia is a condition that can develop during week 20 or later of your pregnancy  It causes high blood pressure, and it can cause problems with your kidneys and other organs  · A Tdap vaccine  may be recommended by your healthcare provider  · Fundal height  is a measurement of your uterus to check your baby's growth  This number is usually the same as the number of weeks that you have been pregnant  Your healthcare provider may also check your baby's position  · Your baby's heart rate  will be checked  © 2017 [address]  Information is for End User's use only and may not be sold, redistributed or otherwise used for commercial purposes  All illustrations and images included in CareNotes® are the copyrighted property of [employer]  or Eyad Amaral  The above information is an  only  It is not intended as medical advice for individual conditions or treatments  Talk to your doctor, nurse or pharmacist before following any medical regimen to see if it is safe and effective for you

## 2018-09-24 NOTE — TELEPHONE ENCOUNTER
----- Message from Jackie Blow, [address]  sent at 9/24/2018 10:52 AM EDT -----  Please notify patient her thyroid test was normal  Thanks

## 2018-09-24 NOTE — PROGRESS NOTES
Problem List Items Addressed This Visit     Herpes simplex virus infection    Supervision of elderly multigravida in third trimester - Primary    Tachycardia    Relevant Orders    TSH, 3rd generation with Free T4 reflex    Ambulatory referral to Cardiology    Flu vaccine need    Relevant Orders    SYRINGE/SINGLE-DOSE VIAL: influenza vaccine, 1636-3319, quadrivalent, 0 5 mL, preservative-free, for patients 3+ yr (FLUZONE) (Completed)        Feels well but has been having her "heart race" recently  Usually after activity  Will check TSH and given referral for cariology to call if it continues  Flu shot today  Has check in card  Denies LOF/CTX/VB  No concerns  Discussed fetal kick counting

## 2018-09-27 ENCOUNTER — TELEPHONE (OUTPATIENT)
Dept: OBGYN CLINIC | Facility: MEDICAL CENTER | Age: 38
End: 2018-09-27

## 2018-09-27 NOTE — TELEPHONE ENCOUNTER
Pt was scheduled for an ob appt with Emma Moreno today and didn't show, we called and left a message for her to call back

## 2018-09-28 NOTE — TELEPHONE ENCOUNTER
Spoke with pt - she had cancelled all her appointments ar the [facility] office and had rescheduled them for the [facility] office due to it being closer  She saw janes on 09/24 and next appointment is 10/09 with Janes

## 2018-10-09 ENCOUNTER — ROUTINE PRENATAL (OUTPATIENT)
Dept: OBGYN CLINIC | Age: 38
End: 2018-10-09

## 2018-10-09 VITALS — WEIGHT: 158 LBS | SYSTOLIC BLOOD PRESSURE: 112 MMHG | DIASTOLIC BLOOD PRESSURE: 70 MMHG | BODY MASS INDEX: 29.85 KG/M2

## 2018-10-09 DIAGNOSIS — O09.523 SUPERVISION OF ELDERLY MULTIGRAVIDA IN THIRD TRIMESTER: Primary | ICD-10-CM

## 2018-10-09 DIAGNOSIS — R00.0 TACHYCARDIA: ICD-10-CM

## 2018-10-09 DIAGNOSIS — B00.9 HERPES SIMPLEX VIRUS INFECTION: ICD-10-CM

## 2018-10-09 DIAGNOSIS — Z3A.34 34 WEEKS GESTATION OF PREGNANCY: ICD-10-CM

## 2018-10-09 PROCEDURE — PNV: Performed by: NURSE PRACTITIONER

## 2018-10-09 RX ORDER — VALACYCLOVIR HYDROCHLORIDE 500 MG/1
500 TABLET, FILM COATED ORAL 2 TIMES DAILY
Qty: 60 TABLET | Refills: 0 | Status: ON HOLD | OUTPATIENT
Start: 2018-10-09 | End: 2018-11-08 | Stop reason: SDUPTHER

## 2018-10-09 NOTE — PATIENT INSTRUCTIONS
Pregnancy at 28 to 38 Weeks   AMBULATORY CARE:   What changes are happening to your body: You are considered full term at the beginning of 37 weeks  Your breathing may be easier if your baby has moved down into a head-down position  You may need to urinate more often because the baby may be pressing on your bladder  You may also feel more discomfort and get tired easily  Seek care immediately if:   · You develop a severe headache that does not go away  · You have new or increased vision changes, such as blurred or spotted vision  · You have new or increased swelling in your face or hands  · You have vaginal spotting or bleeding  · Your water broke or you feel warm water gushing or trickling from your vagina  Contact your healthcare provider if:   · You have more than 5 contractions in 1 hour  · You notice any changes in your baby's movements  · You have abdominal cramps, pressure, or tightening  · You have a change in vaginal discharge  · You have chills or a fever  · You have vaginal itching, burning, or pain  · You have yellow, green, white, or foul-smelling vaginal discharge  · You have pain or burning when you urinate, less urine than usual, or pink or bloody urine  · You have questions or concerns about your condition or care  How to care for yourself at this stage of your pregnancy:   · Eat a variety of healthy foods  Healthy foods include fruits, vegetables, whole-grain breads, low-fat dairy foods, beans, lean meats, and fish  Drink liquids as directed  Ask how much liquid to drink each day and which liquids are best for you  Limit caffeine to less than 200 milligrams each day  Limit your intake of fish to 2 servings each week  Choose fish low in mercury such as canned light tuna, shrimp, crab, salmon, cod, or tilapia  Do not  eat fish high in mercury such as swordfish, tilefish, thania mackerel, and shark  · Take prenatal vitamins as directed    Your need for certain vitamins and minerals, such as folic acid, increases during pregnancy  Prenatal vitamins provide some of the extra vitamins and minerals you need  Prenatal vitamins may also help to decrease the risk of certain birth defects  · Rest as needed  Put your feet up if you have swelling in your ankles and feet  · Do not smoke  If you smoke, it is never too late to quit  Smoking increases your risk of a miscarriage and other health problems during your pregnancy  Smoking can cause your baby to be born too early or weigh less at birth  Ask your healthcare provider for information if you need help quitting  · Do not drink alcohol  Alcohol passes from your body to your baby through the placenta  It can affect your baby's brain development and cause fetal alcohol syndrome (FAS)  FAS is a group of conditions that causes mental, behavior, and growth problems  · Talk to your healthcare provider before you take any medicines  Many medicines may harm your baby if you take them when you are pregnant  Do not take any medicines, vitamins, herbs, or supplements without first talking to your healthcare provider  Never use illegal or street drugs (such as marijuana or cocaine) while you are pregnant  · Talk to your healthcare provider before you travel  You may not be able to travel in an airplane after 36 weeks  He may also recommend that you avoid long road trips  Safety tips during pregnancy:   · Avoid hot tubs and saunas  Do not use a hot tub or sauna while you are pregnant, especially during your first trimester  Hot tubs and saunas may raise your baby's temperature and increase the risk of birth defects  · Avoid toxoplasmosis  This is an infection caused by eating raw meat or being around infected cat feces  It can cause birth defects, miscarriages, and other problems  Wash your hands after you touch raw meat  Make sure any meat is well-cooked before you eat it   Avoid raw eggs and unpasteurized milk  Use gloves or ask someone else to clean your cat's litter box while you are pregnant  · Ask your healthcare provider about travel  The most comfortable time to travel is during the second trimester  Ask your healthcare provider if you can travel after 36 weeks  You may not be able to travel in an airplane after 36 weeks  He may also recommend that you avoid long road trips  Changes that are happening with your baby:  By 38 weeks, your baby may weigh between 6 and 9 pounds  Your baby may be about 14 inches long from the top of the head to the rump (baby's bottom)  Your baby hears well enough to know your voice  As your baby gets larger, you may feel fewer kicks and more stretching and rolling  Your baby may move into a head-down position  Your baby will also rest lower in your abdomen  What you need to know about prenatal care: Your healthcare provider will check your blood pressure and weight  You may also need the following:  · A urine test  may also be done to check for sugar and protein  These can be signs of gestational diabetes or infection  Protein in your urine may also be a sign of preeclampsia  Preeclampsia is a condition that can develop during week 20 or later of your pregnancy  It causes high blood pressure, and it can cause problems with your kidneys and other organs  · A blood test  may be done to check for anemia (low iron level)  · A Tdap vaccine  may be recommended by your healthcare provider  · A group B strep test  is a test that is done to check for group B strep infection  Group B strep is a type of bacteria that may be found in the vagina or rectum  It can be passed to your baby during delivery if you have it  Your healthcare provider will take swab your vagina or rectum and send the sample to the lab for tests  · Fundal height  is a measurement of your uterus to check your baby's growth   This number is usually the same as the number of weeks that you have been pregnant  Your healthcare provider may also check your baby's position  · Your baby's heart rate  will be checked  © 2017 [address] [employer] Information is for End User's use only and may not be sold, redistributed or otherwise used for commercial purposes  All illustrations and images included in CareNotes® are the copyrighted property of [employer]  or Eyad Amaral  The above information is an  only  It is not intended as medical advice for individual conditions or treatments  Talk to your doctor, nurse or pharmacist before following any medical regimen to see if it is safe and effective for you

## 2018-10-09 NOTE — PROGRESS NOTES
Problem List Items Addressed This Visit     Herpes simplex virus infection    Relevant Medications    valACYclovir (VALTREX) 500 mg tablet    Supervision of elderly multigravida in third trimester - Primary    Tachycardia    34 weeks gestation of pregnancy        Feels well  Denies LOF/CTX/VB  No concerns  Discussed fetal kick counting  Cardiology appt on 10/16 for continued occasional palpitations  TSH wnl    HSV prophylaxis to start at 36 weeks

## 2018-10-16 ENCOUNTER — TELEPHONE (OUTPATIENT)
Dept: OBGYN CLINIC | Facility: MEDICAL CENTER | Age: 38
End: 2018-10-16

## 2018-10-16 NOTE — TELEPHONE ENCOUNTER
Pt is 35 weeks, has swelling in her feet, ankles, and hands   told her to call if she gets that  Please advise

## 2018-10-16 NOTE — TELEPHONE ENCOUNTER
Returned pts' call  Pt has c/o "swelling in her feet, ankles, hands "  Pt denies H/A's, epigastric pain, pts most recent  BP was 112/70  Pt also c/o pelvic pressure " advised pt to increase water intake, decrease salt intake, elevate legs whenever possible, also can use support knee highs  -(informed on how to put on)  Informed pt- is normal to have pelvic pressure at this time during pregnancy  pt denies vag blding, LOF, cxtns, has +FM  Advised to call if now relief of symptoms or symptoms of labor

## 2018-10-17 ENCOUNTER — OFFICE VISIT (OUTPATIENT)
Dept: CARDIOLOGY CLINIC | Facility: CLINIC | Age: 38
End: 2018-10-17
Payer: COMMERCIAL

## 2018-10-17 VITALS
OXYGEN SATURATION: 98 % | HEART RATE: 94 BPM | WEIGHT: 161.3 LBS | BODY MASS INDEX: 30.45 KG/M2 | HEIGHT: 61 IN | SYSTOLIC BLOOD PRESSURE: 128 MMHG | RESPIRATION RATE: 18 BRPM | DIASTOLIC BLOOD PRESSURE: 78 MMHG

## 2018-10-17 DIAGNOSIS — R00.2 PALPITATIONS: ICD-10-CM

## 2018-10-17 DIAGNOSIS — R00.2 PALPITATIONS: Primary | ICD-10-CM

## 2018-10-17 DIAGNOSIS — R06.00 DYSPNEA ON EXERTION: Primary | ICD-10-CM

## 2018-10-17 PROCEDURE — 99245 OFF/OP CONSLTJ NEW/EST HI 55: CPT | Performed by: INTERNAL MEDICINE

## 2018-10-17 NOTE — PROGRESS NOTES
Cardiology Consultation     Scott Kimcarlene  [id number]    Duke Jacobson    Dear Ms Army Ngo is a 26-OIOP-DKF  female who has been referred to the [address] [place] [address] of Cardiology with the following issues:    1  Resting tachycardia, occasional palpitations  2  Increasing dyspnea on exertion with lower extremity edema    Corina Ahr has been in her normal state of health up until about the last week or 2  Her pregnancy has been progressing normally, though she has been having resting tachycardia  Initial laboratory findings are unremarkable including a normal TSH is occasional palpitations but denies any syncope or presyncope  She does admit to significant dyspnea on exertion and 3 pillow orthopnea  She admits to lower extremity edema, right worse than left  She is drinking 4-5 bottles of water a day  She denies paroxysmal nocturnal dyspnea, nausea, vomiting, diaphoresis or any other signs of hypoperfusion        Patient Active Problem List   Diagnosis    Herpes simplex virus infection    HPV in female    Asymptomatic bacteriuria during pregnancy    Supervision of elderly multigravida in third trimester    28 weeks gestation of pregnancy    Supervision of low-risk pregnancy, second trimester    Tachycardia    Flu vaccine need    34 weeks gestation of pregnancy     Past Medical History:   Diagnosis Date    Herpes simplex virus infection 3/20/2018    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     Social History     Social History    Marital status: /Civil Union     Spouse name: Juanita Hill Number of children: N/A    Years of education: N/A     Occupational History   [employer]      Social History Main Topics    Smoking status: Never Smoker    Smokeless tobacco: Never Used    Alcohol use No    Drug use: No    Sexual activity: Yes Partners: Male     Birth control/ protection: None     Other Topics Concern    Not on file     Social History Narrative    No narrative on file      Family History   Problem Relation Age of Onset    Hypertension Mother     No Known Problems Brother     No Known Problems Daughter     Cancer Paternal Grandmother     No Known Problems Paternal Grandfather     No Known Problems Sister      Past Surgical History:   Procedure Laterality Date    DILATION AND CURETTAGE OF UTERUS      TOTAL HIP ARTHROPLASTY Right 2012       Current Outpatient Prescriptions:     Prenatal Vit-Fe Fumarate-FA (PRENATAL FA PO), Take by mouth, Disp: , Rfl:     valACYclovir (VALTREX) 500 mg tablet, Take 1 tablet (500 mg total) by mouth 2 (two) times a day for 30 days, Disp: 60 tablet, Rfl: 0  No Known Allergies  Vitals:    10/17/18 1701   BP: 128/78   Pulse: 94   Resp: 18   SpO2: 98%   Weight: 73 2 kg (161 lb 4 8 oz)   Height: 5' 1" (1 549 m)       Labs:    Lab Results   Component Value Date    WBC 7 71 08/21/2018    HGB 11 2 (L) 08/21/2018    HCT 36 2 08/21/2018    MCV 91 08/21/2018     08/21/2018     EKG 10/17/2018:  Sinus tachycardia, normal axis, normal intervals, nonspecific interventricular conduction abnormality, likely benign  Review of Systems:  Review of Systems   Constitutional: Negative  HENT: Negative  Eyes: Negative  Respiratory: Positive for shortness of breath  Cardiovascular: Positive for palpitations and leg swelling  Gastrointestinal: Negative  Endocrine: Negative  Genitourinary: Negative  Musculoskeletal: Negative  Skin: Negative  Allergic/Immunologic: Negative  Neurological: Negative  Hematological: Negative  Psychiatric/Behavioral: Negative  Physical Exam:  Physical Exam   Constitutional: She is oriented to person, place, and time  She appears well-developed and well-nourished  HENT:   Head: Normocephalic and atraumatic  Neck: No JVD present   No tracheal deviation present  No thyromegaly present  Cardiovascular: Normal rate, regular rhythm, S1 normal and S2 normal   PMI is not displaced  Exam reveals no gallop  No murmur heard  Pulses:       Carotid pulses are 2+ on the right side, and 2+ on the left side  Radial pulses are 2+ on the right side, and 2+ on the left side  Femoral pulses are 2+ on the right side, and 2+ on the left side  Popliteal pulses are 2+ on the right side, and 2+ on the left side  Dorsalis pedis pulses are 2+ on the right side, and 2+ on the left side  Posterior tibial pulses are 2+ on the right side, and 2+ on the left side  Pulmonary/Chest: Breath sounds normal  No accessory muscle usage  No respiratory distress  Abdominal:   Gravid abdomen  Musculoskeletal: She exhibits no edema  Bilateral LE edema, R>L, non-pitting  Neurological: She is alert and oriented to person, place, and time  Skin: Skin is warm and dry  No rash noted  No erythema  Discussion/Summary:  Dyspnea on exertion with lower extremity edema  Palpitations in resting tachycardia    Vertie Lords symptoms are concerning and difficult to discern from normal physiologic changes of pregnancy and having a gravid abdomen versus a potentially more nefarious cause  We need to rule out renal and electrolyte abnormalities, peripartum cardiomyopathy, malignant arrhythmia and DVT/VTE  Check BMP, magnesium and phosphorus  Echocardiogram in 48 hr Holter monitor were ordered  Check lower extremity venous duplex to rule out DVT  She will follow up in this office in 1 week for review of her studies  Thank you for the opportunity to consult on this patient  If you have any questions, please feel free to call my office

## 2018-10-17 NOTE — LETTER
2018     Zachery [facility] [place] [address]  [address] [facility]    Patient: Debbie Gerard   YOB: 1980   Date of Visit: 10/17/2018       Dear Dr Lorenzo Clifton: Thank you for referring Debbie Gerard to me for evaluation  Below are my notes for this consultation  If you have questions, please do not hesitate to call me  I look forward to following your patient along with you  Sincerely,        Presley Denver, DO        CC: No Recipients  Presley Denver, DO  10/17/2018  6:06 PM  Sign at close encounter                                             Cardiology Consultation     Debbie Gerard  [id number]    Duke Hollis    Dear Ms  Laz Mcguire is a 50-WJYZ-PSZ  female who has been referred to the [address] [place] [address] of Cardiology with the following issues:    1  Resting tachycardia, occasional palpitations  2  Increasing dyspnea on exertion with lower extremity edema    Shahnaz Humphrey has been in her normal state of health up until about the last week or 2  Her pregnancy has been progressing normally, though she has been having resting tachycardia  Initial laboratory findings are unremarkable including a normal TSH is occasional palpitations but denies any syncope or presyncope  She does admit to significant dyspnea on exertion and 3 pillow orthopnea  She admits to lower extremity edema, right worse than left  She is drinking 4-5 bottles of water a day  She denies paroxysmal nocturnal dyspnea, nausea, vomiting, diaphoresis or any other signs of hypoperfusion        Patient Active Problem List   Diagnosis    Herpes simplex virus infection    HPV in female    Asymptomatic bacteriuria during pregnancy    Supervision of elderly multigravida in third trimester    28 weeks gestation of pregnancy    Supervision of low-risk pregnancy, second trimester    Tachycardia    Flu vaccine need    34 weeks gestation of pregnancy     Past Medical History:   Diagnosis Date    Herpes simplex virus infection 3/20/2018    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     Social History     Social History    Marital status: /Civil Union     Spouse name: Kimberly Carson Number of children: N/A    Years of education: N/A     Occupational History          Social History Main Topics    Smoking status: Never Smoker    Smokeless tobacco: Never Used    Alcohol use No    Drug use: No    Sexual activity: Yes     Partners: Male     Birth control/ protection: None     Other Topics Concern    Not on file     Social History Narrative    No narrative on file      Family History   Problem Relation Age of Onset    Hypertension Mother     No Known Problems Brother     No Known Problems Daughter     Cancer Paternal Grandmother     No Known Problems Paternal Grandfather     No Known Problems Sister      Past Surgical History:   Procedure Laterality Date    DILATION AND CURETTAGE OF UTERUS      TOTAL HIP ARTHROPLASTY Right 2012       Current Outpatient Prescriptions:     Prenatal Vit-Fe Fumarate-FA (PRENATAL FA PO), Take by mouth, Disp: , Rfl:     valACYclovir (VALTREX) 500 mg tablet, Take 1 tablet (500 mg total) by mouth 2 (two) times a day for 30 days, Disp: 60 tablet, Rfl: 0  No Known Allergies  Vitals:    10/17/18 1701   BP: 128/78   Pulse: 94   Resp: 18   SpO2: 98%   Weight: 73 2 kg (161 lb 4 8 oz)   Height: 5' 1" (1 549 m)       Labs:    Lab Results   Component Value Date    WBC 7 71 08/21/2018    HGB 11 2 (L) 08/21/2018    HCT 36 2 08/21/2018    MCV 91 08/21/2018     08/21/2018     EKG 10/17/2018:  Sinus tachycardia, normal axis, normal intervals, nonspecific interventricular conduction abnormality, likely benign  Review of Systems:  Review of Systems   Constitutional: Negative  HENT: Negative  Eyes: Negative      Respiratory: Positive for shortness of breath  Cardiovascular: Positive for palpitations and leg swelling  Gastrointestinal: Negative  Endocrine: Negative  Genitourinary: Negative  Musculoskeletal: Negative  Skin: Negative  Allergic/Immunologic: Negative  Neurological: Negative  Hematological: Negative  Psychiatric/Behavioral: Negative  Physical Exam:  Physical Exam   Constitutional: She is oriented to person, place, and time  She appears well-developed and well-nourished  HENT:   Head: Normocephalic and atraumatic  Neck: No JVD present  No tracheal deviation present  No thyromegaly present  Cardiovascular: Normal rate, regular rhythm, S1 normal and S2 normal   PMI is not displaced  Exam reveals no gallop  No murmur heard  Pulses:       Carotid pulses are 2+ on the right side, and 2+ on the left side  Radial pulses are 2+ on the right side, and 2+ on the left side  Femoral pulses are 2+ on the right side, and 2+ on the left side  Popliteal pulses are 2+ on the right side, and 2+ on the left side  Dorsalis pedis pulses are 2+ on the right side, and 2+ on the left side  Posterior tibial pulses are 2+ on the right side, and 2+ on the left side  Pulmonary/Chest: Breath sounds normal  No accessory muscle usage  No respiratory distress  Abdominal:   Gravid abdomen  Musculoskeletal: She exhibits no edema  Bilateral LE edema, R>L, non-pitting  Neurological: She is alert and oriented to person, place, and time  Skin: Skin is warm and dry  No rash noted  No erythema  Discussion/Summary:  Dyspnea on exertion with lower extremity edema  Palpitations in resting tachycardia    Dipika Cortlynne symptoms are concerning and difficult to discern from normal physiologic changes of pregnancy and having a gravid abdomen versus a potentially more nefarious cause      We need to rule out renal and electrolyte abnormalities, peripartum cardiomyopathy, malignant arrhythmia and DVT/VTE  Check BMP, magnesium and phosphorus  Echocardiogram in 48 hr Holter monitor were ordered  Check lower extremity venous duplex to rule out DVT  She will follow up in this office in 1 week for review of her studies  Thank you for the opportunity to consult on this patient  If you have any questions, please feel free to call my office

## 2018-10-18 ENCOUNTER — HOSPITAL ENCOUNTER (OUTPATIENT)
Dept: NON INVASIVE DIAGNOSTICS | Facility: CLINIC | Age: 38
Discharge: HOME | End: 2018-10-18
Payer: COMMERCIAL

## 2018-10-18 DIAGNOSIS — R00.2 PALPITATIONS: ICD-10-CM

## 2018-10-18 DIAGNOSIS — R06.00 DYSPNEA ON EXERTION: ICD-10-CM

## 2018-10-18 PROCEDURE — 93225 XTRNL ECG REC<48 HRS REC: CPT

## 2018-10-18 PROCEDURE — 93306 TTE W/DOPPLER COMPLETE: CPT

## 2018-10-18 PROCEDURE — 93226 XTRNL ECG REC<48 HR SCAN A/R: CPT

## 2018-10-18 PROCEDURE — 93970 EXTREMITY STUDY: CPT

## 2018-10-18 PROCEDURE — 93970 EXTREMITY STUDY: CPT | Performed by: SURGERY

## 2018-10-18 PROCEDURE — 93306 TTE W/DOPPLER COMPLETE: CPT | Performed by: INTERNAL MEDICINE

## 2018-10-22 ENCOUNTER — TELEPHONE (OUTPATIENT)
Dept: CARDIOLOGY CLINIC | Facility: CLINIC | Age: 38
End: 2018-10-22

## 2018-10-22 NOTE — TELEPHONE ENCOUNTER
Pt stopped in and would like to know if it would possible for Dr Mary Ramos to write her a letter for work suggesting that she should start her maternity leave now(her due date is 3 weeks away)  Pt states that it is becoming very hard for her to keep up with her SOB & swelling in her feet and ankles  Pt had testing done that Dr Mary Ramos ordered & just returned her holter monitor today  She will be seeing Dr Mary Ramos for an appt on Wednesday    Please advise

## 2018-10-22 NOTE — TELEPHONE ENCOUNTER
Please prepare a letter to that accord and I will sign and give to her when I see her in the office      [address]

## 2018-10-24 ENCOUNTER — OFFICE VISIT (OUTPATIENT)
Dept: CARDIOLOGY CLINIC | Facility: CLINIC | Age: 38
End: 2018-10-24
Payer: COMMERCIAL

## 2018-10-24 VITALS
OXYGEN SATURATION: 99 % | HEART RATE: 100 BPM | HEIGHT: 61 IN | BODY MASS INDEX: 30.83 KG/M2 | DIASTOLIC BLOOD PRESSURE: 68 MMHG | SYSTOLIC BLOOD PRESSURE: 122 MMHG | WEIGHT: 163.3 LBS

## 2018-10-24 DIAGNOSIS — R00.0 SINUS TACHYCARDIA: Primary | ICD-10-CM

## 2018-10-24 PROCEDURE — 99213 OFFICE O/P EST LOW 20 MIN: CPT | Performed by: INTERNAL MEDICINE

## 2018-10-24 NOTE — LETTER
2018     [facility] [place] [address]  [address] [facility]    Patient: Beto Comer   YOB: 1980   Date of Visit: 10/24/2018       Dear Dr Mehdi Watson: Thank you for referring Beto Comer to me for evaluation  Below are my notes for this consultation  If you have questions, please do not hesitate to call me  I look forward to following your patient along with you  Sincerely,        Alireza Atkinson DO        CC: No Recipients  Ailreza Atkinson DO  10/24/2018  9:55 PM  Sign at close encounter                                             Cardiology Consultation     Beto Comer  [id number]    Duke Abraham    Dear Ms Meme Barajas is a 8-EHKU-CKW  female who has been referred to the [address] [place] [address] of Cardiology with the following issues:    1  Resting tachycardia, occasional palpitations  2  Increasing dyspnea on exertion with lower extremity edema    She underwent echocardiogram and holter monitor  Echocardiogram is WNL  Holter monitor shows sinus tachycardia, but no malignant arrhythmia  She continues to be conscious of her tachycardia, but denies chest pain         Patient Active Problem List   Diagnosis    Herpes simplex virus infection    HPV in female    Asymptomatic bacteriuria during pregnancy    Supervision of elderly multigravida in third trimester    28 weeks gestation of pregnancy    Supervision of low-risk pregnancy, second trimester    Tachycardia    Flu vaccine need    34 weeks gestation of pregnancy     Past Medical History:   Diagnosis Date    Herpes simplex virus infection 3/20/2018    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     Social History     Social History    Marital status: /Civil Union     Spouse name: Elen Lamas Number of children: N/A    Years of education: N/A Occupational History          Social History Main Topics    Smoking status: Never Smoker    Smokeless tobacco: Never Used    Alcohol use No    Drug use: No    Sexual activity: Yes     Partners: Male     Birth control/ protection: None     Other Topics Concern    Not on file     Social History Narrative    No narrative on file      Family History   Problem Relation Age of Onset    Hypertension Mother     No Known Problems Brother     No Known Problems Daughter     Cancer Paternal Grandmother     No Known Problems Paternal Grandfather     No Known Problems Sister      Past Surgical History:   Procedure Laterality Date    DILATION AND CURETTAGE OF UTERUS      TOTAL HIP ARTHROPLASTY Right 2012       Current Outpatient Prescriptions:     Prenatal Vit-Fe Fumarate-FA (PRENATAL FA PO), Take by mouth, Disp: , Rfl:     valACYclovir (VALTREX) 500 mg tablet, Take 1 tablet (500 mg total) by mouth 2 (two) times a day for 30 days, Disp: 60 tablet, Rfl: 0  No Known Allergies  Vitals:    10/24/18 1550   BP: 122/68   Pulse: 100   SpO2: 99%   Weight: 74 1 kg (163 lb 4 8 oz)   Height: 5' 1" (1 549 m)       Labs:    Lab Results   Component Value Date    WBC 7 71 08/21/2018    HGB 11 2 (L) 08/21/2018    HCT 36 2 08/21/2018    MCV 91 08/21/2018     08/21/2018     TTE 10/18/2018: IMPRESSIONS:  Normal left ventricular size and systolic function, ejection fraction 65%  Normal right ventricular size and function  Globally normal cardiac structures  No significant valve abnormality  No evidence of pulmonary hypertension  Possibly bovine aortic arch anatomy  No prior study for comparison  Holter Monitor 10/18/2018:  FINDINGS:  1  The patient had predominantly sinus tachycardia  2  Heart rate varied from 77 bpm to 164 bpm   The patients average heart rate was 108 bpm     3  The patient had a holter monitor tracing done for 47hours and 59 minutes    4  The patient had rare ventricular ectopic activity (3 beats) consisting of PVCs  5  The patient had rare supraventricular ectopy (4 beats) consisting of PACs  6  The longest R/R interval was 0 9 seconds  7  No other major arrhythmia was noted  8  Attached diary notes two separate episodes of "rapid heart rate "  Corresponding tracings are sinus tachycardia at 111 bpm and normal sinus rhythm at 98 bpm      CONCLUSION:  Predominantly sinus tachycardia with rare ventricular or supraventricular ectopy  No malignant arrhythmia  Review of Systems:  Review of Systems   Constitutional: Negative  HENT: Negative  Eyes: Negative  Respiratory: Positive for shortness of breath  Cardiovascular: Positive for palpitations and leg swelling  Gastrointestinal: Negative  Endocrine: Negative  Genitourinary: Negative  Musculoskeletal: Negative  Skin: Negative  Allergic/Immunologic: Negative  Neurological: Negative  Hematological: Negative  Psychiatric/Behavioral: Negative  Physical Exam:  Physical Exam   Constitutional: She is oriented to person, place, and time  She appears well-developed and well-nourished  HENT:   Head: Normocephalic and atraumatic  Neck: No JVD present  No tracheal deviation present  No thyromegaly present  Cardiovascular: Normal rate, regular rhythm, S1 normal and S2 normal   PMI is not displaced  Exam reveals no gallop  No murmur heard  Pulses:       Carotid pulses are 2+ on the right side, and 2+ on the left side  Radial pulses are 2+ on the right side, and 2+ on the left side  Femoral pulses are 2+ on the right side, and 2+ on the left side  Popliteal pulses are 2+ on the right side, and 2+ on the left side  Dorsalis pedis pulses are 2+ on the right side, and 2+ on the left side  Posterior tibial pulses are 2+ on the right side, and 2+ on the left side  Pulmonary/Chest: Breath sounds normal  No accessory muscle usage  No respiratory distress  Abdominal:   Gravid abdomen  Musculoskeletal: She exhibits no edema  Bilateral LE edema, R>L, non-pitting  Neurological: She is alert and oriented to person, place, and time  Skin: Skin is warm and dry  No rash noted  No erythema  Discussion/Summary:  Dyspnea on exertion with lower extremity edema  Persistent sinus tachycardia    We discussed options regarding heart rate control  At this point, I suggested holding off on medications, as her tachycardia is likely due to the volume shifts of pregnancy and she may be intravascular depleted  I encouraged her to ingest more isotonic fluid (i e  Gatorade)  She will go on maternity leave now  If this persists, we can consider low dose beta blocker, but I would like to consult with OB/Gyn first     Thank you for the opportunity to consult on this patient  If you have any questions, please feel free to call my office

## 2018-10-25 NOTE — PROGRESS NOTES
Cardiology Consultation     Beto Comer  [id number]    Duke Abraham    Dear Ms  Meme Barajas is a [id number]  female who has been referred to the [address] [place] [address] of Cardiology with the following issues:    1  Resting tachycardia, occasional palpitations  2  Increasing dyspnea on exertion with lower extremity edema    She underwent echocardiogram and holter monitor  Echocardiogram is WNL  Holter monitor shows sinus tachycardia, but no malignant arrhythmia  She continues to be conscious of her tachycardia, but denies chest pain         Patient Active Problem List   Diagnosis    Herpes simplex virus infection    HPV in female    Asymptomatic bacteriuria during pregnancy    Supervision of elderly multigravida in third trimester    28 weeks gestation of pregnancy    Supervision of low-risk pregnancy, second trimester    Tachycardia    Flu vaccine need    34 weeks gestation of pregnancy     Past Medical History:   Diagnosis Date    Herpes simplex virus infection 3/20/2018    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     Social History     Social History    Marital status: /Civil Union     Spouse name: Elen Lamas Number of children: N/A    Years of education: N/A     Occupational History   [employer]      Social History Main Topics    Smoking status: Never Smoker    Smokeless tobacco: Never Used    Alcohol use No    Drug use: No    Sexual activity: Yes     Partners: Male     Birth control/ protection: None     Other Topics Concern    Not on file     Social History Narrative    No narrative on file      Family History   Problem Relation Age of Onset    Hypertension Mother     No Known Problems Brother     No Known Problems Daughter     Cancer Paternal Grandmother     No Known Problems Paternal Grandfather     No Known Problems Sister      Past Surgical History:   Procedure Laterality Date    DILATION AND CURETTAGE OF UTERUS      TOTAL HIP ARTHROPLASTY Right 2012       Current Outpatient Prescriptions:     Prenatal Vit-Fe Fumarate-FA (PRENATAL FA PO), Take by mouth, Disp: , Rfl:     valACYclovir (VALTREX) 500 mg tablet, Take 1 tablet (500 mg total) by mouth 2 (two) times a day for 30 days, Disp: 60 tablet, Rfl: 0  No Known Allergies  Vitals:    10/24/18 1550   BP: 122/68   Pulse: 100   SpO2: 99%   Weight: 74 1 kg (163 lb 4 8 oz)   Height: 5' 1" (1 549 m)       Labs:    Lab Results   Component Value Date    WBC 7 71 08/21/2018    HGB 11 2 (L) 08/21/2018    HCT 36 2 08/21/2018    MCV 91 08/21/2018     08/21/2018     TTE 10/18/2018: IMPRESSIONS:  Normal left ventricular size and systolic function, ejection fraction 65%  Normal right ventricular size and function  Globally normal cardiac structures  No significant valve abnormality  No evidence of pulmonary hypertension  Possibly bovine aortic arch anatomy  No prior study for comparison  Holter Monitor 10/18/2018:  FINDINGS:  1  The patient had predominantly sinus tachycardia  2  Heart rate varied from 77 bpm to 164 bpm   The patients average heart rate was 108 bpm     3  The patient had a holter monitor tracing done for 47hours and 59 minutes  4  The patient had rare ventricular ectopic activity (3 beats) consisting of PVCs  5  The patient had rare supraventricular ectopy (4 beats) consisting of PACs  6  The longest R/R interval was 0 9 seconds  7  No other major arrhythmia was noted  8  Attached diary notes two separate episodes of "rapid heart rate "  Corresponding tracings are sinus tachycardia at 111 bpm and normal sinus rhythm at 98 bpm      CONCLUSION:  Predominantly sinus tachycardia with rare ventricular or supraventricular ectopy  No malignant arrhythmia  Review of Systems:  Review of Systems   Constitutional: Negative      HENT: Negative  Eyes: Negative  Respiratory: Positive for shortness of breath  Cardiovascular: Positive for palpitations and leg swelling  Gastrointestinal: Negative  Endocrine: Negative  Genitourinary: Negative  Musculoskeletal: Negative  Skin: Negative  Allergic/Immunologic: Negative  Neurological: Negative  Hematological: Negative  Psychiatric/Behavioral: Negative  Physical Exam:  Physical Exam   Constitutional: She is oriented to person, place, and time  She appears well-developed and well-nourished  HENT:   Head: Normocephalic and atraumatic  Neck: No JVD present  No tracheal deviation present  No thyromegaly present  Cardiovascular: Normal rate, regular rhythm, S1 normal and S2 normal   PMI is not displaced  Exam reveals no gallop  No murmur heard  Pulses:       Carotid pulses are 2+ on the right side, and 2+ on the left side  Radial pulses are 2+ on the right side, and 2+ on the left side  Femoral pulses are 2+ on the right side, and 2+ on the left side  Popliteal pulses are 2+ on the right side, and 2+ on the left side  Dorsalis pedis pulses are 2+ on the right side, and 2+ on the left side  Posterior tibial pulses are 2+ on the right side, and 2+ on the left side  Pulmonary/Chest: Breath sounds normal  No accessory muscle usage  No respiratory distress  Abdominal:   Gravid abdomen  Musculoskeletal: She exhibits no edema  Bilateral LE edema, R>L, non-pitting  Neurological: She is alert and oriented to person, place, and time  Skin: Skin is warm and dry  No rash noted  No erythema  Discussion/Summary:  Dyspnea on exertion with lower extremity edema  Persistent sinus tachycardia    We discussed options regarding heart rate control  At this point, I suggested holding off on medications, as her tachycardia is likely due to the volume shifts of pregnancy and she may be intravascular depleted    I encouraged her to ingest more isotonic fluid (i e  Gatorade)  She will go on maternity leave now  If this persists, we can consider low dose beta blocker, but I would like to consult with OB/Gyn first     Thank you for the opportunity to consult on this patient  If you have any questions, please feel free to call my office

## 2018-10-26 ENCOUNTER — ROUTINE PRENATAL (OUTPATIENT)
Dept: OBGYN CLINIC | Age: 38
End: 2018-10-26

## 2018-10-26 VITALS — SYSTOLIC BLOOD PRESSURE: 120 MMHG | DIASTOLIC BLOOD PRESSURE: 80 MMHG | BODY MASS INDEX: 30.8 KG/M2 | WEIGHT: 163 LBS

## 2018-10-26 DIAGNOSIS — O09.523 ENCOUNTER FOR SUPERVISION OF ELDERLY MULTIGRAVIDA IN THIRD TRIMESTER, ANTEPARTUM: Primary | ICD-10-CM

## 2018-10-26 DIAGNOSIS — R00.0 TACHYCARDIA: ICD-10-CM

## 2018-10-26 DIAGNOSIS — O09.523 SUPERVISION OF ELDERLY MULTIGRAVIDA IN THIRD TRIMESTER: ICD-10-CM

## 2018-10-26 DIAGNOSIS — B00.9 HERPES SIMPLEX VIRUS INFECTION: ICD-10-CM

## 2018-10-26 PROCEDURE — 87653 STREP B DNA AMP PROBE: CPT | Performed by: NURSE PRACTITIONER

## 2018-10-26 PROCEDURE — PNV: Performed by: NURSE PRACTITIONER

## 2018-10-26 NOTE — PROGRESS NOTES
Problem List Items Addressed This Visit     Herpes simplex virus infection    Supervision of elderly multigravida in third trimester    Encounter for supervision of elderly multigravida in third trimester, antepartum - Primary    Relevant Orders    Strep B DNA probe, amplification      Other Visit Diagnoses     Tachycardia            Feels well  Denies LOF/CTX/VB  No concerns  Discussed fetal kick counting  Stopped working 2 days ago  Palpitations stable  GBS today

## 2018-10-27 PROCEDURE — 93227 XTRNL ECG REC<48 HR R&I: CPT | Performed by: INTERNAL MEDICINE

## 2018-10-28 LAB — GP B STREP DNA SPEC QL NAA+PROBE: NORMAL

## 2018-11-01 ENCOUNTER — ROUTINE PRENATAL (OUTPATIENT)
Dept: OBGYN CLINIC | Facility: MEDICAL CENTER | Age: 38
End: 2018-11-01

## 2018-11-01 VITALS — DIASTOLIC BLOOD PRESSURE: 62 MMHG | SYSTOLIC BLOOD PRESSURE: 122 MMHG | WEIGHT: 164.8 LBS | BODY MASS INDEX: 31.14 KG/M2

## 2018-11-01 DIAGNOSIS — Z3A.38 38 WEEKS GESTATION OF PREGNANCY: Primary | ICD-10-CM

## 2018-11-01 PROCEDURE — PNV: Performed by: OBSTETRICS & GYNECOLOGY

## 2018-11-01 NOTE — PROGRESS NOTES
Patient is a 31-year-old female, para 1, at 38 weeks and 1 day here for routine prenatal visit with a complaint of itchy palms last night  Her itchy palms resolved  Review of systems is positive for fetal movement negative for loss of fluid vaginal bleeding or regular contractions  Vertex by limited office ultrasound  Patient was recently worked up for heart palpitations her Holter monitor was normal her echo was normal   Patient also had swelling of her right leg    Dopplers of the lower extremities were normal

## 2018-11-05 ENCOUNTER — ANESTHESIA (INPATIENT)
Dept: LABOR AND DELIVERY | Facility: HOSPITAL | Age: 38
End: 2018-11-05
Payer: COMMERCIAL

## 2018-11-05 ENCOUNTER — ANESTHESIA EVENT (INPATIENT)
Dept: LABOR AND DELIVERY | Facility: HOSPITAL | Age: 38
End: 2018-11-05
Payer: COMMERCIAL

## 2018-11-05 ENCOUNTER — TELEPHONE (OUTPATIENT)
Dept: OBGYN CLINIC | Facility: CLINIC | Age: 38
End: 2018-11-05

## 2018-11-05 ENCOUNTER — HOSPITAL ENCOUNTER (INPATIENT)
Facility: HOSPITAL | Age: 38
LOS: 4 days | Discharge: HOME | End: 2018-11-09
Attending: OBSTETRICS & GYNECOLOGY | Admitting: OBSTETRICS & GYNECOLOGY
Payer: COMMERCIAL

## 2018-11-05 DIAGNOSIS — Z3A.38 38 WEEKS GESTATION OF PREGNANCY: Primary | ICD-10-CM

## 2018-11-05 LAB
ABO GROUP BLD: NORMAL
BASOPHILS # BLD AUTO: 0.02 THOUSANDS/ΜL (ref 0–0.1)
BASOPHILS NFR BLD AUTO: 0 % (ref 0–1)
BLD GP AB SCN SERPL QL: NEGATIVE
EOSINOPHIL # BLD AUTO: 0.02 THOUSAND/ΜL (ref 0–0.61)
EOSINOPHIL NFR BLD AUTO: 0 % (ref 0–6)
ERYTHROCYTE [DISTWIDTH] IN BLOOD BY AUTOMATED COUNT: 16.3 % (ref 11.6–15.1)
HCT VFR BLD AUTO: 38.1 % (ref 34.8–46.1)
HGB BLD-MCNC: 11.9 G/DL (ref 11.5–15.4)
IMM GRANULOCYTES # BLD AUTO: 0.03 THOUSAND/UL (ref 0–0.2)
IMM GRANULOCYTES NFR BLD AUTO: 1 % (ref 0–2)
LYMPHOCYTES # BLD AUTO: 1.33 THOUSANDS/ΜL (ref 0.6–4.47)
LYMPHOCYTES NFR BLD AUTO: 20 % (ref 14–44)
MCH RBC QN AUTO: 26.6 PG (ref 26.8–34.3)
MCHC RBC AUTO-ENTMCNC: 31.2 G/DL (ref 31.4–37.4)
MCV RBC AUTO: 85 FL (ref 82–98)
MONOCYTES # BLD AUTO: 0.51 THOUSAND/ΜL (ref 0.17–1.22)
MONOCYTES NFR BLD AUTO: 8 % (ref 4–12)
NEUTROPHILS # BLD AUTO: 4.6 THOUSANDS/ΜL (ref 1.85–7.62)
NEUTS SEG NFR BLD AUTO: 71 % (ref 43–75)
NRBC BLD AUTO-RTO: 0 /100 WBCS
PLATELET # BLD AUTO: 195 THOUSANDS/UL (ref 149–390)
PMV BLD AUTO: 11.7 FL (ref 8.9–12.7)
RBC # BLD AUTO: 4.47 MILLION/UL (ref 3.81–5.12)
RH BLD: POSITIVE
SPECIMEN EXPIRATION DATE: NORMAL
WBC # BLD AUTO: 6.51 THOUSAND/UL (ref 4.31–10.16)

## 2018-11-05 PROCEDURE — 10H07YZ INSERTION OF OTHER DEVICE INTO PRODUCTS OF CONCEPTION, VIA NATURAL OR ARTIFICIAL OPENING: ICD-10-PCS | Performed by: OBSTETRICS & GYNECOLOGY

## 2018-11-05 PROCEDURE — 86900 BLOOD TYPING SEROLOGIC ABO: CPT | Performed by: OBSTETRICS & GYNECOLOGY

## 2018-11-05 PROCEDURE — 85025 COMPLETE CBC W/AUTO DIFF WBC: CPT | Performed by: OBSTETRICS & GYNECOLOGY

## 2018-11-05 PROCEDURE — 86901 BLOOD TYPING SEROLOGIC RH(D): CPT | Performed by: OBSTETRICS & GYNECOLOGY

## 2018-11-05 PROCEDURE — 99205 OFFICE O/P NEW HI 60 MIN: CPT

## 2018-11-05 PROCEDURE — 86592 SYPHILIS TEST NON-TREP QUAL: CPT | Performed by: OBSTETRICS & GYNECOLOGY

## 2018-11-05 PROCEDURE — 4A1H7CZ MONITORING OF PRODUCTS OF CONCEPTION, CARDIAC RATE, VIA NATURAL OR ARTIFICIAL OPENING: ICD-10-PCS | Performed by: OBSTETRICS & GYNECOLOGY

## 2018-11-05 PROCEDURE — 86850 RBC ANTIBODY SCREEN: CPT | Performed by: OBSTETRICS & GYNECOLOGY

## 2018-11-05 PROCEDURE — 10907ZC DRAINAGE OF AMNIOTIC FLUID, THERAPEUTIC FROM PRODUCTS OF CONCEPTION, VIA NATURAL OR ARTIFICIAL OPENING: ICD-10-PCS | Performed by: OBSTETRICS & GYNECOLOGY

## 2018-11-05 PROCEDURE — G0463 HOSPITAL OUTPT CLINIC VISIT: HCPCS

## 2018-11-05 PROCEDURE — 96360 HYDRATION IV INFUSION INIT: CPT

## 2018-11-05 RX ORDER — ONDANSETRON 2 MG/ML
4 INJECTION INTRAMUSCULAR; INTRAVENOUS EVERY 6 HOURS PRN
Status: DISCONTINUED | OUTPATIENT
Start: 2018-11-05 | End: 2018-11-06 | Stop reason: DRUGHIGH

## 2018-11-05 RX ORDER — SODIUM CHLORIDE, SODIUM LACTATE, POTASSIUM CHLORIDE, CALCIUM CHLORIDE 600; 310; 30; 20 MG/100ML; MG/100ML; MG/100ML; MG/100ML
125 INJECTION, SOLUTION INTRAVENOUS CONTINUOUS
Status: DISCONTINUED | OUTPATIENT
Start: 2018-11-05 | End: 2018-11-06

## 2018-11-05 RX ORDER — FENTANYL CITRATE 50 UG/ML
INJECTION, SOLUTION INTRAMUSCULAR; INTRAVENOUS
Status: COMPLETED
Start: 2018-11-05 | End: 2018-11-05

## 2018-11-05 RX ORDER — FENTANYL CITRATE 50 UG/ML
INJECTION, SOLUTION INTRAMUSCULAR; INTRAVENOUS AS NEEDED
Status: DISCONTINUED | OUTPATIENT
Start: 2018-11-05 | End: 2018-11-06 | Stop reason: SURG

## 2018-11-05 RX ORDER — BUTORPHANOL TARTRATE 1 MG/ML
1 INJECTION, SOLUTION INTRAMUSCULAR; INTRAVENOUS ONCE
Status: COMPLETED | OUTPATIENT
Start: 2018-11-05 | End: 2018-11-05

## 2018-11-05 RX ORDER — PROMETHAZINE HYDROCHLORIDE 25 MG/ML
12.5 INJECTION, SOLUTION INTRAMUSCULAR; INTRAVENOUS ONCE
Status: COMPLETED | OUTPATIENT
Start: 2018-11-05 | End: 2018-11-05

## 2018-11-05 RX ORDER — DIPHENHYDRAMINE HYDROCHLORIDE 50 MG/ML
12.5 INJECTION INTRAMUSCULAR; INTRAVENOUS ONCE
Status: COMPLETED | OUTPATIENT
Start: 2018-11-05 | End: 2018-11-05

## 2018-11-05 RX ORDER — BUPIVACAINE HYDROCHLORIDE 2.5 MG/ML
INJECTION, SOLUTION INFILTRATION; PERINEURAL AS NEEDED
Status: DISCONTINUED | OUTPATIENT
Start: 2018-11-05 | End: 2018-11-06 | Stop reason: SURG

## 2018-11-05 RX ORDER — OXYTOCIN/RINGER'S LACTATE 30/500 ML
PLASTIC BAG, INJECTION (ML) INTRAVENOUS
Status: COMPLETED
Start: 2018-11-05 | End: 2018-11-06

## 2018-11-05 RX ADMIN — ROPIVACAINE HYDROCHLORIDE: 2 INJECTION, SOLUTION EPIDURAL; INFILTRATION at 17:52

## 2018-11-05 RX ADMIN — PROMETHAZINE HYDROCHLORIDE 12.5 MG: 25 INJECTION INTRAMUSCULAR; INTRAVENOUS at 15:32

## 2018-11-05 RX ADMIN — BUPIVACAINE HYDROCHLORIDE 1 ML: 2.5 INJECTION, SOLUTION INFILTRATION; PERINEURAL at 17:36

## 2018-11-05 RX ADMIN — DIPHENHYDRAMINE HYDROCHLORIDE 12.5 MG: 50 INJECTION, SOLUTION INTRAMUSCULAR; INTRAVENOUS at 18:58

## 2018-11-05 RX ADMIN — SODIUM CHLORIDE, SODIUM LACTATE, POTASSIUM CHLORIDE, AND CALCIUM CHLORIDE 1000 ML: .6; .31; .03; .02 INJECTION, SOLUTION INTRAVENOUS at 15:22

## 2018-11-05 RX ADMIN — SODIUM CHLORIDE, SODIUM LACTATE, POTASSIUM CHLORIDE, AND CALCIUM CHLORIDE 125 ML/HR: .6; .31; .03; .02 INJECTION, SOLUTION INTRAVENOUS at 17:30

## 2018-11-05 RX ADMIN — FENTANYL CITRATE 10 MCG: 50 INJECTION, SOLUTION INTRAMUSCULAR; INTRAVENOUS at 17:36

## 2018-11-05 RX ADMIN — BUTORPHANOL TARTRATE 1 MG: 1 INJECTION, SOLUTION INTRAMUSCULAR; INTRAVENOUS at 15:29

## 2018-11-05 RX ADMIN — SODIUM CHLORIDE, SODIUM LACTATE, POTASSIUM CHLORIDE, AND CALCIUM CHLORIDE 999 ML/HR: .6; .31; .03; .02 INJECTION, SOLUTION INTRAVENOUS at 22:24

## 2018-11-05 NOTE — ANESTHESIA PREPROCEDURE EVALUATION
40 yo  at 38+5 in labor, requesting analgesia  Review of Systems/Medical History  Patient summary reviewed  Chart reviewed  No history of anesthetic complications (prior epidural; does not recall problems)     Cardiovascular  Negative cardio ROS    Pulmonary  Negative pulmonary ROS        GI/Hepatic  Negative GI/hepatic ROS          Negative  ROS        Endo/Other  Negative endo/other ROS      GYN  Currently pregnant , Prior pregnancy/OB history : 3 Parity: 1,          Hematology  Negative hematology ROS   No coagulation disorder ,    Musculoskeletal  Negative musculoskeletal ROS        Neurology  Negative neurology ROS      Psychology   Negative psychology ROS              Physical Exam    Airway    Mallampati score: II         Dental   No notable dental hx     Cardiovascular  Comment: Negative ROS,     Pulmonary      Other Findings      Lab Results   Component Value Date    HGB 11 9 2018     2018       Anesthesia Plan  ASA Score- 2     Anesthesia Type- spinal and epidural with ASA Monitors  Additional Monitors:   Airway Plan:         Plan Factors-    Induction-     Postoperative Plan-     Informed Consent- Anesthetic plan and risks discussed with patient and spouse

## 2018-11-05 NOTE — PROGRESS NOTES
Triage Note - OB  Mary Grace Jones 41 y o  female MRN: [id number]  Unit/Bed#: LD Triage  Encounter: [id number]    Chief Complaint:  Contraction pain  ROBE: Estimated Date of Delivery: 18    HPI: Patient is a  at 38w5d here with complaints of contractions that began last night- she reports that around 7:00 a m  The contractions got progressively stronger and closer together and reports they are currently 6-7 min apart  She denies any loss of fluid  She reports some brown discharge but denies any sushil vaginal bleeding  She reports good fetal movement  Patient has an obstetric history significant for a history of a vaginal delivery in   This pregnancy is complicated by advanced maternal age and HSV on Valtrex  She denies any prodromal symptoms on reports her last outbreak was about 1 year ago  She is GBS negative  Patient declines anything for pain at this time  Vitals:   /74 (BP Location: Right arm)   Pulse 98   Temp 98 3 °F (36 8 °C) (Oral)   Resp 18   Ht 5' 1" (1 549 m)   Wt 70 3 kg (155 lb)   LMP 01/15/2018 (Within Weeks)   BMI 29 29 kg/m²   Body mass index is 29 29 kg/m²  Physical Exam  GEN:  Reports common in no acute distress, appears uncomfortable with contractions  ABD:  Soft, nontender, gravid uterus  SVE: Dilation: 1-2  Effacement (%): 80  Station: -3  Method: Manual  OB Examiner: Ekonomidis    FHT:  Baseline Rate: 140 bpm  Variability: Moderate 6-25 bpm  Accelerations: 15 x 15 or greater, At variable times  Decelerations: None  TOCO:   Contraction Frequency (minutes): 4-5  Contraction Duration (seconds): 70-80  Contraction Quality: Mild, Moderate    Lab, Imaging and other studies: I have personally reviewed pertinent reports      A/P:  41year-old  001 at 38 weeks and 5 days for rule out labor  1) recheck in 2 hr  2) eFHM and toco    Alexis Kolb MD  2018  12:34 PM      Reassessment at 1500  Patient reports stronger contractions on appears uncomfortable  Cervical change noted now 3-4/80/-3  Fetal heart tracing remains without signs of fetal acidemia  Will therapeutic rest with 1 mg Stadol and 12 5 mg Phenergan IV and reassess once patient is awake  Discussed with Dr Leopold Dad, MD   3:06 PM    Reassessment at 2978  Patient will be admitted for labor- Please see H&P      D/w Dr Leopold Dad, MD  11/05/18

## 2018-11-05 NOTE — OB LABOR/OXYTOCIN SAFETY PROGRESS
Labor Progress Note - Jenae Po 41 y o  female MRN: [id number]    Unit/Bed#: -01 Encounter: [id number]    Obstetric History       T1      L1     SAB1   TAB0   Ectopic0   Multiple0   Live Births1      Gestational Age: 44w7d     Contraction Frequency (minutes): 2-6  Contraction Quality: Moderate  Tachysystole: No   Dilation: 4-5        Effacement (%): 90  Station: -1  Baseline Rate: 135 bpm  Fetal Heart Rate: 151 BPM             Notes/comments:     Comfortable after epidural   SVE now 4 /-1  Membranes intact  Cat I tracing  AROM at next check  Dr Domenico Maurice aware      Joaquín Kc MD 2018 5:57 PM

## 2018-11-05 NOTE — TELEPHONE ENCOUNTER
Dear Dr Kim Fiore:    Spoke with Pt today via phone call  Pt's ROBE is 18, GA 38 wks + 5 dys,  3 Para 1  Pt has history of miscarriage  Pt was previously assessed with tachycardia, herpes simplex virus infection, and elderly multigravida  Pt reports cramping that has been approximately 6 to 7 minutes apart  Pt rates cramping pain as a "4"/"5" on pain scale  Pt further reports that upon wiping after voiding she noticed a "tinge of brown" on the toilet tissue  Pt reports positive fetal movement  Pt denies leakage of fluid or consistent vaginal bleeding  Pt states she was unable to sleep last night due to pain  Patient's name is Gold Laney (: [birth year])  Please advise  Thank you!     Chasity Ross

## 2018-11-05 NOTE — H&P
H&P Exam - Obstetrics   Laybryan Gimenez 41 y o  female MRN: [id number]  Unit/Bed#: LD Triage  Encounter: [id number]      History of Present Illness     Chief Complaint:  Contraction pain    HPI:  Nida Gimenez is a [id number] at 38w5d here with complaints of contractions that began last night- she reports that around 7:00 a m  The contractions got progressively stronger and closer together and reports they are currently 6-7 min apart  She denies any loss of fluid  She reports some brown discharge but denies any sushil vaginal bleeding  She reports good fetal movement  The patient was noted to make cervical change in triage and is being admitted for labor  She received Stadol and Phenergan for labor pain  She would eventually like an epidural     PREGNANCY COMPLICATIONS:  HSV on valtrex: She denies any prodromal symptoms on reports her last outbreak was about 1 year ago  AMA  Long interval pregnancy    OB History    Para Term  AB Living   3 1 1 0 1 1   SAB TAB Ectopic Multiple Live Births   1 0 0 0 1      # Outcome Date GA Lbr James/2nd Weight Sex Delivery Anes PTL Lv   3 Current            2 SAB 2006     SAB         Complications: History of D&C   1 Term 10/12/02 40w0d  3062 g (6 lb 12 oz) F Vag-Spont EPI N RHONA          Baby complications/comments:   Vertex presentation by ultrasound today  Anterior placenta  EFW 78 percentile on 2018  Today by Thanh Miranda I anticipate an EFW about 7-7 5 lb  Negative and NIPT    Review of Systems   Constitutional: Positive for fatigue  Negative for appetite change, chills, fever and unexpected weight change  Eyes: Negative for visual disturbance  Respiratory: Negative for chest tightness, shortness of breath and wheezing  Cardiovascular: Negative for chest pain, palpitations and leg swelling  Gastrointestinal: Positive for abdominal pain  Negative for constipation, diarrhea, nausea and vomiting     Genitourinary: Negative for dysuria, flank pain, frequency, hematuria and urgency  Musculoskeletal: Positive for back pain  Negative for arthralgias, gait problem, myalgias and neck pain  Neurological: Negative for dizziness, weakness, light-headedness and headaches  Historical Information   Past Medical History:   Diagnosis Date    AMA (advanced maternal age) multigravida 35+     Herpes simplex virus infection 3/20/2018    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    Palpitations     halter monitor no problems    Varicella     childhood     Past Surgical History:   Procedure Laterality Date    DILATION AND CURETTAGE OF UTERUS      TOTAL HIP ARTHROPLASTY Right 2012     Social History   History   Alcohol Use No     History   Drug Use No     History   Smoking Status    Never Smoker   Smokeless Tobacco    Never Used     Family History:   Family History   Problem Relation Age of Onset    Hypertension Mother     Hypothyroidism Mother    Corry Lama Hypothyroidism Father     Asthma Sister     Hypothyroidism Sister     No Known Problems Brother     No Known Problems Daughter     Cancer Maternal Grandfather         prostate    Cancer Paternal Grandmother     No Known Problems Paternal Grandfather     No Known Problems Sister     Cancer Maternal Aunt         leukemia and brst       Meds/Allergies    {  Prescriptions Prior to Admission   Medication    Prenatal Vit-Fe Fumarate-FA (PRENATAL FA PO)    valACYclovir (VALTREX) 500 mg tablet      No Known Allergies    OBJECTIVE:    Vitals:   /74 (BP Location: Right arm)   Pulse 98   Temp 98 3 °F (36 8 °C) (Oral)   Resp 18   Ht 5' 1" (1 549 m)   Wt 70 3 kg (155 lb)   LMP 01/15/2018 (Within Weeks)   BMI 29 29 kg/m²   Body mass index is 29 29 kg/m²  Physical Exam   Constitutional: She is oriented to person, place, and time  She appears well-developed and well-nourished  No distress  HENT:   Head: Normocephalic and atraumatic  Neck: Normal range of motion  Neck supple     Cardiovascular: Normal rate, regular rhythm and normal heart sounds  Exam reveals no gallop and no friction rub  No murmur heard  Pulmonary/Chest: Effort normal and breath sounds normal  No respiratory distress  She has no wheezes  She has no rales  Abdominal: Soft  There is no tenderness  There is no rebound and no guarding  Genitourinary: Vagina normal    Genitourinary Comments: No visible ulcerations or lesions   Neurological: She is alert and oriented to person, place, and time  Skin: Skin is warm and dry  She is not diaphoretic  Psychiatric: She has a normal mood and affect  Her behavior is normal    Vitals reviewed  SVE: Dilation: 3-4  Effacement (%): 80  Station: -2  Method: Manual  OB Examiner: Andreas    FHT:  Baseline Rate: 145 bpm  Variability: Moderate 6-25 bpm  Accelerations: 15 x 15 or greater, Episodic  Decelerations: None  TOCO:   Contraction Frequency (minutes): 3-6  Contraction Duration (seconds): 60-90  Contraction Quality: Moderate      Prenatal Labs:   Blood type:  O positive  Antigen Screen: negative  Rubella: Immune  HIV: Negative  RPR: NR  Hep B: negative  Diabetes Screen: 99  GBS:  Negative    Assessment/Plan     ASSESSMENT:  81-year-old  011 at 38w5d weeks gestation late in labor, GBS negative    PLAN:   1) Admit to Labor and delivery   2) Admission labs:  CBC, RPR, Blood Type   3) Analgesia and/or epidural at patient request   4) Anticipate    5) FEN:  Clear diet, IV fluid  cc/hour      This patient will be an INPATIENT  and I certify the anticipated length of stay is >2 Midnights        Alexis Kolb MD  2018  4:02 PM

## 2018-11-05 NOTE — ANESTHESIA PROCEDURE NOTES
CSE Block    Patient location during procedure: OB  Start time: 11/5/2018 5:36 PM  Reason for block: procedure for pain and at surgeon's request  Staffing  Anesthesiologist: Sameer Wilkinson  Performed: anesthesiologist   Preanesthetic Checklist  Completed: patient identified, surgical consent, pre-op evaluation, timeout performed, IV checked, risks and benefits discussed and monitors and equipment checked  CSE  Patient position: sitting  Prep: ChloraPrep and site prepped and draped  Patient monitoring: heart rate, continuous pulse ox and frequent blood pressure checks  Approach: midline  Spinal Needle  Needle type: pencil-tip   Needle gauge: 27 G  Epidural Needle  Injection technique: CYNTHIA saline  Needle type: Tuohy   Epidural needle gauge: 17 g  Location: lumbar (1-5) (L4/5)  Catheter  Catheter type: side hole  Catheter size: 19 g  Catheter at skin depth: 12 cm  Assessment  Injection Assessment:  negative aspiration for heme, no pain on injection, no paresthesia on injection and positive aspiration for clear CSF  Additional Notes  Pt positioned sitting, timeout, sterile prep and drape  Pt very anxious,  providing excellent coaching during placement  Placement 1 attempt/pass at L 4/5 with CYNTHIA to NS  Needle through needle CSE with + CSF, easy injection, + relief  Cath threaded easily, negative aspiration  Patient laid supine with SIMEON, PCEA initiated

## 2018-11-05 NOTE — TELEPHONE ENCOUNTER
Pt called office today, left vm message  Pt's ROBE is 18, GA 38 wks + 5 dys,  3 Para 1  Pt saw Dr Tashi Diamond on 18, scheduled to see him again on 18  Pt states she is having cramping, wants to speak with a nurse  Pt can be reached @ [phone]

## 2018-11-05 NOTE — TELEPHONE ENCOUNTER
Spoke with Pt today via phone call  Pt informed that her reported symptoms were addressed with Dr Abelardo Londono (physician on call - am)  Pt states she continues to have painful cramping  Pt advised to report to triage (L&D unit) per Dr Katrin Pires recommendation via "tiger text"  Pt further states her spouse will drive her to triage unit  Phone call made to triage unit to inform staff of Pt's impending arrival per Dr Katrin Pires directive

## 2018-11-06 LAB
BASE EXCESS BLDCOA CALC-SCNC: -5.2 MMOL/L (ref 3–11)
BASE EXCESS BLDCOV CALC-SCNC: -2.7 MMOL/L (ref 1–9)
ERYTHROCYTE [DISTWIDTH] IN BLOOD BY AUTOMATED COUNT: 16.4 % (ref 11.6–15.1)
HCO3 BLDCOA-SCNC: 21.6 MMOL/L (ref 17.3–27.3)
HCO3 BLDCOV-SCNC: 22.9 MMOL/L (ref 12.2–28.6)
HCT VFR BLD AUTO: 26.3 % (ref 34.8–46.1)
HGB BLD-MCNC: 8.3 G/DL (ref 11.5–15.4)
MCH RBC QN AUTO: 27.2 PG (ref 26.8–34.3)
MCHC RBC AUTO-ENTMCNC: 31.6 G/DL (ref 31.4–37.4)
MCV RBC AUTO: 86 FL (ref 82–98)
O2 CT VFR BLDCOA CALC: 3.9 ML/DL
OXYHGB MFR BLDCOA: 18.5 %
OXYHGB MFR BLDCOV: 37.4 %
PCO2 BLDCOA: 46.2 MM[HG] (ref 30–60)
PCO2 BLDCOV: 43 MM HG (ref 27–43)
PH BLDCOA: 7.29 [PH] (ref 7.23–7.43)
PH BLDCOV: 7.34 [PH] (ref 7.19–7.49)
PLATELET # BLD AUTO: 153 THOUSANDS/UL (ref 149–390)
PMV BLD AUTO: 11 FL (ref 8.9–12.7)
PO2 BLDCOA: 12 MM HG (ref 5–25)
PO2 BLDCOV: 16 MM HG (ref 15–45)
RBC # BLD AUTO: 3.05 MILLION/UL (ref 3.81–5.12)
RPR SER QL: NORMAL
SAO2 % BLDCOV: 8 ML/DL
WBC # BLD AUTO: 15.1 THOUSAND/UL (ref 4.31–10.16)

## 2018-11-06 PROCEDURE — 88307 TISSUE EXAM BY PATHOLOGIST: CPT | Performed by: PATHOLOGY

## 2018-11-06 PROCEDURE — 85027 COMPLETE CBC AUTOMATED: CPT | Performed by: OBSTETRICS & GYNECOLOGY

## 2018-11-06 PROCEDURE — 82805 BLOOD GASES W/O2 SATURATION: CPT | Performed by: OBSTETRICS & GYNECOLOGY

## 2018-11-06 PROCEDURE — 59510 CESAREAN DELIVERY: CPT | Performed by: OBSTETRICS & GYNECOLOGY

## 2018-11-06 RX ORDER — CALCIUM CARBONATE 200(500)MG
1000 TABLET,CHEWABLE ORAL DAILY PRN
Status: DISCONTINUED | OUTPATIENT
Start: 2018-11-06 | End: 2018-11-09 | Stop reason: HOSPADM

## 2018-11-06 RX ORDER — LIDOCAINE HYDROCHLORIDE 10 MG/ML
INJECTION, SOLUTION EPIDURAL; INFILTRATION; INTRACAUDAL; PERINEURAL AS NEEDED
Status: DISCONTINUED | OUTPATIENT
Start: 2018-11-06 | End: 2018-11-06 | Stop reason: SURG

## 2018-11-06 RX ORDER — KETOROLAC TROMETHAMINE 30 MG/ML
30 INJECTION, SOLUTION INTRAMUSCULAR; INTRAVENOUS EVERY 6 HOURS
Status: COMPLETED | OUTPATIENT
Start: 2018-11-06 | End: 2018-11-07

## 2018-11-06 RX ORDER — DOCUSATE SODIUM 100 MG/1
100 CAPSULE, LIQUID FILLED ORAL 2 TIMES DAILY
Status: DISCONTINUED | OUTPATIENT
Start: 2018-11-06 | End: 2018-11-09 | Stop reason: HOSPADM

## 2018-11-06 RX ORDER — ACETAMINOPHEN 325 MG/1
650 TABLET ORAL EVERY 6 HOURS PRN
Status: DISPENSED | OUTPATIENT
Start: 2018-11-06 | End: 2018-11-07

## 2018-11-06 RX ORDER — SIMETHICONE 80 MG
80 TABLET,CHEWABLE ORAL 4 TIMES DAILY PRN
Status: DISCONTINUED | OUTPATIENT
Start: 2018-11-06 | End: 2018-11-09 | Stop reason: HOSPADM

## 2018-11-06 RX ORDER — FENTANYL CITRATE/PF 50 MCG/ML
50 SYRINGE (ML) INJECTION
Status: DISCONTINUED | OUTPATIENT
Start: 2018-11-06 | End: 2018-11-09 | Stop reason: HOSPADM

## 2018-11-06 RX ORDER — LIDOCAINE HYDROCHLORIDE AND EPINEPHRINE 20; 5 MG/ML; UG/ML
INJECTION, SOLUTION EPIDURAL; INFILTRATION; INTRACAUDAL; PERINEURAL AS NEEDED
Status: DISCONTINUED | OUTPATIENT
Start: 2018-11-06 | End: 2018-11-06 | Stop reason: SURG

## 2018-11-06 RX ORDER — DIAPER,BRIEF,INFANT-TODD,DISP
1 EACH MISCELLANEOUS DAILY PRN
Status: DISCONTINUED | OUTPATIENT
Start: 2018-11-06 | End: 2018-11-09 | Stop reason: HOSPADM

## 2018-11-06 RX ORDER — HYDROMORPHONE HCL/PF 1 MG/ML
0.2 SYRINGE (ML) INJECTION
Status: DISCONTINUED | OUTPATIENT
Start: 2018-11-06 | End: 2018-11-09 | Stop reason: HOSPADM

## 2018-11-06 RX ORDER — EPHEDRINE SULFATE 50 MG/ML
INJECTION, SOLUTION INTRAVENOUS AS NEEDED
Status: DISCONTINUED | OUTPATIENT
Start: 2018-11-06 | End: 2018-11-06 | Stop reason: SURG

## 2018-11-06 RX ORDER — NALOXONE HYDROCHLORIDE 0.4 MG/ML
0.1 INJECTION, SOLUTION INTRAMUSCULAR; INTRAVENOUS; SUBCUTANEOUS
Status: ACTIVE | OUTPATIENT
Start: 2018-11-06 | End: 2018-11-07

## 2018-11-06 RX ORDER — SODIUM CHLORIDE, SODIUM LACTATE, POTASSIUM CHLORIDE, CALCIUM CHLORIDE 600; 310; 30; 20 MG/100ML; MG/100ML; MG/100ML; MG/100ML
125 INJECTION, SOLUTION INTRAVENOUS CONTINUOUS
Status: DISCONTINUED | OUTPATIENT
Start: 2018-11-06 | End: 2018-11-09 | Stop reason: HOSPADM

## 2018-11-06 RX ORDER — OXYCODONE HYDROCHLORIDE AND ACETAMINOPHEN 5; 325 MG/1; MG/1
2 TABLET ORAL EVERY 4 HOURS PRN
Status: DISCONTINUED | OUTPATIENT
Start: 2018-11-07 | End: 2018-11-09 | Stop reason: HOSPADM

## 2018-11-06 RX ORDER — ACETAMINOPHEN 325 MG/1
975 TABLET ORAL EVERY 6 HOURS PRN
Status: DISCONTINUED | OUTPATIENT
Start: 2018-11-06 | End: 2018-11-06 | Stop reason: DRUGHIGH

## 2018-11-06 RX ORDER — DIPHENHYDRAMINE HYDROCHLORIDE 50 MG/ML
25 INJECTION INTRAMUSCULAR; INTRAVENOUS EVERY 6 HOURS PRN
Status: ACTIVE | OUTPATIENT
Start: 2018-11-06 | End: 2018-11-07

## 2018-11-06 RX ORDER — ONDANSETRON 2 MG/ML
4 INJECTION INTRAMUSCULAR; INTRAVENOUS EVERY 4 HOURS PRN
Status: ACTIVE | OUTPATIENT
Start: 2018-11-06 | End: 2018-11-07

## 2018-11-06 RX ORDER — NALBUPHINE HCL 10 MG/ML
5 AMPUL (ML) INJECTION
Status: DISPENSED | OUTPATIENT
Start: 2018-11-06 | End: 2018-11-07

## 2018-11-06 RX ORDER — DEXAMETHASONE SODIUM PHOSPHATE 4 MG/ML
8 INJECTION, SOLUTION INTRA-ARTICULAR; INTRALESIONAL; INTRAMUSCULAR; INTRAVENOUS; SOFT TISSUE ONCE AS NEEDED
Status: ACTIVE | OUTPATIENT
Start: 2018-11-06 | End: 2018-11-07

## 2018-11-06 RX ORDER — IBUPROFEN 600 MG/1
600 TABLET ORAL EVERY 6 HOURS PRN
Status: DISCONTINUED | OUTPATIENT
Start: 2018-11-07 | End: 2018-11-09 | Stop reason: HOSPADM

## 2018-11-06 RX ORDER — HYDROMORPHONE HCL/PF 1 MG/ML
0.5 SYRINGE (ML) INJECTION EVERY 2 HOUR PRN
Status: DISCONTINUED | OUTPATIENT
Start: 2018-11-06 | End: 2018-11-09 | Stop reason: HOSPADM

## 2018-11-06 RX ORDER — MORPHINE SULFATE 0.5 MG/ML
INJECTION, SOLUTION EPIDURAL; INTRATHECAL; INTRAVENOUS AS NEEDED
Status: DISCONTINUED | OUTPATIENT
Start: 2018-11-06 | End: 2018-11-06 | Stop reason: SURG

## 2018-11-06 RX ORDER — OXYCODONE HYDROCHLORIDE AND ACETAMINOPHEN 5; 325 MG/1; MG/1
1 TABLET ORAL EVERY 4 HOURS PRN
Status: DISCONTINUED | OUTPATIENT
Start: 2018-11-07 | End: 2018-11-09 | Stop reason: HOSPADM

## 2018-11-06 RX ORDER — ONDANSETRON 2 MG/ML
4 INJECTION INTRAMUSCULAR; INTRAVENOUS ONCE AS NEEDED
Status: DISCONTINUED | OUTPATIENT
Start: 2018-11-06 | End: 2018-11-09 | Stop reason: HOSPADM

## 2018-11-06 RX ORDER — OXYTOCIN/RINGER'S LACTATE 30/500 ML
1-30 PLASTIC BAG, INJECTION (ML) INTRAVENOUS
Status: DISCONTINUED | OUTPATIENT
Start: 2018-11-06 | End: 2018-11-06

## 2018-11-06 RX ORDER — HYDROMORPHONE HCL/PF 1 MG/ML
1 SYRINGE (ML) INJECTION EVERY 2 HOUR PRN
Status: DISCONTINUED | OUTPATIENT
Start: 2018-11-07 | End: 2018-11-09 | Stop reason: HOSPADM

## 2018-11-06 RX ORDER — ACETAMINOPHEN 325 MG/1
650 TABLET ORAL EVERY 6 HOURS PRN
Status: DISCONTINUED | OUTPATIENT
Start: 2018-11-06 | End: 2018-11-08

## 2018-11-06 RX ORDER — ONDANSETRON 2 MG/ML
INJECTION INTRAMUSCULAR; INTRAVENOUS AS NEEDED
Status: DISCONTINUED | OUTPATIENT
Start: 2018-11-06 | End: 2018-11-06 | Stop reason: SURG

## 2018-11-06 RX ORDER — METOCLOPRAMIDE HYDROCHLORIDE 5 MG/ML
5 INJECTION INTRAMUSCULAR; INTRAVENOUS EVERY 6 HOURS PRN
Status: ACTIVE | OUTPATIENT
Start: 2018-11-06 | End: 2018-11-07

## 2018-11-06 RX ADMIN — ACETAMINOPHEN 975 MG: 325 TABLET, FILM COATED ORAL at 00:34

## 2018-11-06 RX ADMIN — LIDOCAINE HYDROCHLORIDE 5 ML: 10 INJECTION, SOLUTION EPIDURAL; INFILTRATION; INTRACAUDAL; PERINEURAL at 03:18

## 2018-11-06 RX ADMIN — EPHEDRINE SULFATE 10 MG: 50 INJECTION, SOLUTION INTRAMUSCULAR; INTRAVENOUS; SUBCUTANEOUS at 05:43

## 2018-11-06 RX ADMIN — LIDOCAINE HYDROCHLORIDE AND EPINEPHRINE 2 ML: 20; 5 INJECTION, SOLUTION EPIDURAL; INFILTRATION; INTRACAUDAL; PERINEURAL at 05:38

## 2018-11-06 RX ADMIN — KETOROLAC TROMETHAMINE 30 MG: 30 INJECTION, SOLUTION INTRAMUSCULAR at 19:40

## 2018-11-06 RX ADMIN — ONDANSETRON 4 MG: 2 INJECTION INTRAMUSCULAR; INTRAVENOUS at 05:36

## 2018-11-06 RX ADMIN — MORPHINE SULFATE 2 MG: 0.5 INJECTION, SOLUTION EPIDURAL; INTRATHECAL; INTRAVENOUS at 05:38

## 2018-11-06 RX ADMIN — ROPIVACAINE HYDROCHLORIDE: 2 INJECTION, SOLUTION EPIDURAL; INFILTRATION at 02:19

## 2018-11-06 RX ADMIN — LIDOCAINE HYDROCHLORIDE AND EPINEPHRINE 5 ML: 20; 5 INJECTION, SOLUTION EPIDURAL; INFILTRATION; INTRACAUDAL; PERINEURAL at 04:52

## 2018-11-06 RX ADMIN — MORPHINE SULFATE 3 MG: 0.5 INJECTION, SOLUTION EPIDURAL; INTRATHECAL; INTRAVENOUS at 05:18

## 2018-11-06 RX ADMIN — NALBUPHINE HYDROCHLORIDE 5 MG: 10 INJECTION, SOLUTION INTRAMUSCULAR; INTRAVENOUS; SUBCUTANEOUS at 20:12

## 2018-11-06 RX ADMIN — SODIUM CHLORIDE, SODIUM LACTATE, POTASSIUM CHLORIDE, AND CALCIUM CHLORIDE: .6; .31; .03; .02 INJECTION, SOLUTION INTRAVENOUS at 05:50

## 2018-11-06 RX ADMIN — KETOROLAC TROMETHAMINE 30 MG: 30 INJECTION, SOLUTION INTRAMUSCULAR at 05:39

## 2018-11-06 RX ADMIN — LIDOCAINE HYDROCHLORIDE AND EPINEPHRINE 2.5 ML: 20; 5 INJECTION, SOLUTION EPIDURAL; INFILTRATION; INTRACAUDAL; PERINEURAL at 05:03

## 2018-11-06 RX ADMIN — CEFAZOLIN SODIUM 2000 MG: 2 SOLUTION INTRAVENOUS at 04:59

## 2018-11-06 RX ADMIN — KETOROLAC TROMETHAMINE 30 MG: 30 INJECTION, SOLUTION INTRAMUSCULAR at 12:42

## 2018-11-06 RX ADMIN — Medication 2 MILLI-UNITS/MIN: at 00:47

## 2018-11-06 RX ADMIN — OXYTOCIN 250 MILLI-UNITS/MIN: 10 INJECTION, SOLUTION INTRAMUSCULAR; INTRAVENOUS at 05:16

## 2018-11-06 RX ADMIN — FENTANYL CITRATE 100 MCG: 50 INJECTION, SOLUTION INTRAMUSCULAR; INTRAVENOUS at 04:47

## 2018-11-06 RX ADMIN — PHENYLEPHRINE HYDROCHLORIDE 30 MCG/MIN: 10 INJECTION INTRAVENOUS at 05:19

## 2018-11-06 RX ADMIN — LIDOCAINE HYDROCHLORIDE AND EPINEPHRINE 5 ML: 20; 5 INJECTION, SOLUTION EPIDURAL; INFILTRATION; INTRACAUDAL; PERINEURAL at 04:47

## 2018-11-06 RX ADMIN — SODIUM CHLORIDE, SODIUM LACTATE, POTASSIUM CHLORIDE, AND CALCIUM CHLORIDE 125 ML/HR: .6; .31; .03; .02 INJECTION, SOLUTION INTRAVENOUS at 11:48

## 2018-11-06 RX ADMIN — SODIUM CHLORIDE, SODIUM LACTATE, POTASSIUM CHLORIDE, AND CALCIUM CHLORIDE 125 ML/HR: .6; .31; .03; .02 INJECTION, SOLUTION INTRAVENOUS at 02:24

## 2018-11-06 RX ADMIN — LIDOCAINE HYDROCHLORIDE AND EPINEPHRINE 2.5 ML: 20; 5 INJECTION, SOLUTION EPIDURAL; INFILTRATION; INTRACAUDAL; PERINEURAL at 05:10

## 2018-11-06 RX ADMIN — AZITHROMYCIN 500 MG: 500 INJECTION, POWDER, LYOPHILIZED, FOR SOLUTION INTRAVENOUS at 05:02

## 2018-11-06 NOTE — LACTATION NOTE
This note was copied from a baby's chart  Aj Escamilla did not have her baby with her at the time of this assessment/education session  Demonstrations for hand expression with the mother  Demonstration for latch and positioning done with a doll and breast prop  Gave suggestions on how to accomplish deep latch by starting latch with infant's nose at the nipple  Then, stroke the upper lip with the nipple  As infant opens mouth, apply the areola and nipple on on top of the tongue so that the nipple impacts with the soft palate to increase comfort with the feeding and to keep infant interested in the feeding longer  Spent time working on different positions that would facilitate better transfer of breastmilk  Discussed 2nd night syndrome and ways to calm infant  Hand out given  Information on hand expression given  Discussed benefits of knowing how to manually express breast including stimulating milk supply, softening nipple for latch and evacuating breast in the event of engorgement  Met with mother  Provided mother with Ready, Set, Baby booklet  Discussed Skin to Skin contact an benefits to mom and baby  Talked about the delay of the first bath until baby has adjusted  Spoke about the benefits of rooming in  Feeding on cue and what that means for recognizing infant's hunger  Avoidance of pacifiers for the first month discussed  Talked about exclusive breastfeeding for the first 6 months  Positioning and latch reviewed as well as showing images of other feeding positions  Discussed the properties of a good latch in any position  Reviewed hand/manual expression  Discussed s/s that baby is getting enough milk and some s/s that breastfeeding dyad may need further help  Gave information on common concerns, what to expect the first few weeks after delivery, preparing for other caregivers, and how partners can help  Resources for support also provided      Encouraged parents to watch breastfeeding class in the education area of My Chart Bedside  Power point handout for breastfeeding class in My Chart Bedside given to family so they can follow along and write down questions they may have  Encouraged parents to call for assistance, questions, and concerns about breastfeeding  Extension provided

## 2018-11-06 NOTE — OB LABOR/OXYTOCIN SAFETY PROGRESS
Labor Progress Note - Orquidea Born 41 y o  female MRN: [id number]    Unit/Bed#: -01 Encounter: [id number]    Obstetric History       T1      L1     SAB1   TAB0   Ectopic0   Multiple0   Live Births1      Gestational Age: 44w7d     Contraction Frequency (minutes): 2-4  Contraction Quality: Moderate  Tachysystole: No   Dilation: 8        Effacement (%): 90  Station: -1  Baseline Rate: 135 bpm  Fetal Heart Rate: 130 BPM  FHR Category: Category I          Notes/comments:     SVE unchanged  Cat I tracing  Will reassess in 2hrs, if no cervical change, will place [facility]  Dr Bety Richard aware      Denzel Quintana MD 2018 9:51 PM

## 2018-11-06 NOTE — OP NOTE
Operative Report - OB/GYN   Aria Sosa 41 y o  female MRN: [id number]  Unit/Bed#: LD PACU- Encounter: [id number]    Indications: failure to progress: arrest of dilation    Pre-operative Diagnosis:   1  38 week 6 day pregnancy  2  HSV on Valtrex     Post-operative Diagnosis: same, delivered    Surgeon: María Elena Pepe MD    Assistant(s): Manjinder Baca MD    Findings:  1  Delivery of viable male on 18 at 0515, weight 8lbs 5oz;  Apgar scores of 8 at one minute and 9 at five minutes  2  Normal appearing placenta with centrally-inserted 3 vessel cord  3  Clear amniotic fluid  4  Grossly normal uterus, tubes, and ovaries    Specimens:   1  Arterial and venous cord gases  2  Cord blood  3  Segment of umbilical cord  4  Placenta to storage     Estimated Blood Loss:  800 mL          Drains: Sherman catheter           Complications:  None; patient tolerated the procedure well  Disposition: PACU            Condition: stable    Operative Technique:     Decision was made to proceed with  section due to arrest of dilation  Patient was made aware of these findings and the proposed plan  Risks, benefits, possible complications, alternate treatment options, and expected outcomes were discussed with the patient  The patient agreed with the proposed plan and gave informed consent  The patient was taken to the operating room where she was properly identified to the OR staff and attending physician  She had already received epidural anesthesia during her labor course, which was augmented appropriately by Dr Estrella Ma preoperatively  Fetal heart tones were appreciated and found to be appropriate  A Sherman catheter and SCDs were in place  The abdomen was prepped with Chloraprep and following appropriate drying time, the patient was draped in the usual sterile manner for a Pfannenstiel incision  The patient had received Ancef 2g IV and Azithromycin 500mg IV pre-operatively for prophylaxis  A Time Out was held and the above information confirmed  The patient was identified as Johnnie Hale and the procedure verified as  Delivery  A Pfannenstiel incision was made and carried down through the underlying subcutaneous tissue to the fascia using a scalpel  Rectus fascia was then incised  We then proceeded in Pradeep-Abdullahi fashion  All anatomic layers were well-demarcated  The rectus muscles were  and the peritoneum was identified, entered, and extended longitudinally with blunt dissection  The bladder blade was inserted  A low transverse uterine incision was made with the scalpel and extended laterally with blunt dissection  The amnion was entered sharply  Surgeons hand was inserted through the hysterotomy and the fetal head was palpated, elevated, and delivered through the uterine incision with the assistance of fundal pressure  Baby had spontaneous cry with good color and tone  The umbilical cord was clamped and cut  The infant was handed off to the  providers  Arterial and venous cord gases, cord blood, and a segment of umbilical cord were obtained for evaluation and promptly sent to the lab  The placenta delivered spontaneously with uterine fundal massage and was noted to have a centrally inserted 3 vessel cord  This was also sent to the lab for placental pathology  The uterus was exteriorized and a moist lap sponge was used to clear the cavity of clots and products of conception  The uterine incision was closed with a running locked suture of 0 Vicryl  Prior to the imbricating layer, two figure of eight 0 Vicryl stitches were used to achieve hemostasis of the left angle of the uterine incision  A second layer of the same suture was used to imbricate the first   Good hemostasis was confirmed upon uterine closure  Warmed normal saline solution was used to irrigate the posterior culdesac and the uterus was returned to the abdomen   The paracolic gutters were inspected and cleared of all clots and debris with irrigation and moist lap sponges  Prior to fascia closing, a figure of eight using 0 Vicryl was again done on the medial aspect of the left angle of the uterine incision  The fascia was closed with a running suture of 0 Vicryl  The skin was closed with 4-0 Monocryl in a subcuticular fashion  Sterile dressing was applied and an abdominal binder was then placed  At the conclusion of the procedure, all needle, sponge, and instrument counts were noted to be correct x2  The patient tolerated the procedure well and was transferred to her the recovery room in stable condition  Dr Nahum Carter was present and participated in all key portions of the case        APGARS: 8 at one minute, 9 at 5 minutes  Blood gases: Arterial pH 7 28, Base excess -5 2 ; Venous pH 7 34, Base excess -2 7      SIGNATURE: Nikunj Jones MD  DATE: November 6, 2018  TIME: 7:33 AM

## 2018-11-06 NOTE — OB LABOR/OXYTOCIN SAFETY PROGRESS
Oxytocin Safety Progress Check Note - Polo Gatica 41 y o  female MRN: [id number]    Unit/Bed#: -01 Encounter: [id number]    Obstetric History       T1      L1     SAB1   TAB0   Ectopic0   Multiple0   Live Births1      Gestational Age: 38w7d  Dose (katelyn-units/min) Oxytocin: 0 katelyn-units/min  Contraction Frequency (minutes): 2-3  Contraction Quality: Moderate  Tachysystole: No   Dilation: 8        Effacement (%): 80  Station: -2  Baseline Rate: 150 bpm  Fetal Heart Rate: 145 BPM  FHR Category: Category II          Notes/comments:         Cervix unchanged  Pitocin titration unable to reach adequate MVU  Patient at this time is very uncomfortable  Despite epidural bolus unable to find relief from pain      At this time due to arrest of dilation will proceed with csection    Owen Posada MD 2018 4:45 AM

## 2018-11-06 NOTE — DISCHARGE INSTRUCTIONS
Section   WHAT YOU SHOULD KNOW:   A  delivery, or , is abdominal surgery to deliver your baby  There are many reasons you may need a   · A  may be scheduled before labor if you had a  with your last baby  It may be scheduled if your baby is not positioned normally, or you are pregnant with more than 1 baby  · Your caregiver may perform an emergency  during labor to prevent life-threatening complications for you or your baby  A  may be done if your cervix does not dilate after several hours of active labor  · Other reasons for a  include maternal infections and problems with the placenta  AFTER YOU LEAVE:   Medicines:   · Prescription pain medicine  may be given  Ask how to take this medicine safely  · Acetaminophen  decreases pain and fever  It is available without a doctor's order  Ask how much to take and how often to take it  Follow directions  Acetaminophen can cause liver damage if not taken correctly  · NSAIDs  help decrease swelling and pain or fever  This medicine is available with or without a doctor's order  NSAIDs can cause stomach bleeding or kidney problems in certain people  If you take blood thinner medicine, always ask your obstetrician if NSAIDs are safe for you  Always read the medicine label and follow directions  · Take your medicine as directed  Contact your obstetrician (OB) if you think your medicine is not helping or if you have side effects  Tell him if you are allergic to any medicine  Keep a list of the medicines, vitamins, and herbs you take  Include the amounts, and when and why you take them  Bring the list or the pill bottles to follow-up visits  Carry your medicine list with you in case of an emergency  Follow up with your OB as directed: You may need to return to have your stitches or staples removed   Write down your questions so you remember to ask them during your visits  Wound care:  Carefully wash your wound with soap and water every day  Keep your wound clean and dry  Wear loose, comfortable clothes that do not rub against your wound  Ask your OB about bathing and showering  Drink plenty of liquids: You can lower your risk for a blood clot if you drink plenty of liquids  Ask how much liquid to drink each day and which liquids are best for you  Limit activity until you have fully recovered from surgery:   · Ask when it is safe for you to drive, walk up stairs, lift heavy objects, and have sex  · Ask when it is okay to exercise, and what types of exercise to do  Start slowly and do more as you get stronger  Contact your OB if:   · You have heavy vaginal bleeding that fills 1 or more sanitary pads in 1 hour  · You have a fever  · Your incision is swollen, red, or draining pus  · You have questions or concerns about yourself or your baby  Seek care immediately or call 911 if:   · Blood soaks through your bandage  · Your stitches come apart  · You feel lightheaded, short of breath, and have chest pain  · You cough up blood  · Your arm or leg feels warm, tender, and painful  It may look swollen and red  [address] is for End User's use only and may not be sold, redistributed or otherwise used for commercial purposes  All illustrations and images included in CareNotes® are the copyrighted property of [employer]  or Eyad Amaral  The above information is an  only  It is not intended as medical advice for individual conditions or treatments  Talk to your doctor, nurse or pharmacist before following any medical regimen to see if it is safe and effective for you  Postpartum Bleeding   WHAT YOU NEED TO KNOW:   Postpartum bleeding is vaginal bleeding after childbirth  This bleeding is normal, whether your baby was born vaginally or by    It contains blood and the tissue that lined the inside of your uterus when you were pregnant  DISCHARGE INSTRUCTIONS:   What to expect with postpartum bleeding:  Postpartum bleeding usually lasts at least 10 days, and may last longer than 6 weeks  Your bleeding may range from light (barely staining a pad) to heavy (soaking a pad in 1 hour)  Usually, you have heavier bleeding right after childbirth, which slows over the next few weeks until it stops  The bleeding is red or dark brown with clots for the first 1 to 3 days  It then turns pink for several days, and then becomes a white or yellow discharge until it ends  Follow up with your obstetrician as directed:  Do not have sex until your obstetrician says it is okay  Write down your questions so you remember to ask them during your visits  Contact your healthcare provider or obstetrician if:   · Your bleeding increases, or you have heavy bleeding that soaks a pad in 1 hour for 2 hours in a row  · You pass large blood clots  · You are breathing faster than normal, or your heart is beating faster than normal     · You are urinating less than usual, or not at all  · You feel dizzy  · You have questions or concerns about your condition or care  Seek immediate care or call 911 if:   · You are suddenly short of breath and feel lightheaded  · You have sudden chest pain  © 2017 [address]  Information is for End User's use only and may not be sold, redistributed or otherwise used for commercial purposes  All illustrations and images included in CareNotes® are the copyrighted property of A [employer] M [employer]  or Eyad Amaral  The above information is an  only  It is not intended as medical advice for individual conditions or treatments  Talk to your doctor, nurse or pharmacist before following any medical regimen to see if it is safe and effective for you  Postpartum Depression   WHAT YOU NEED TO KNOW:   What is postpartum depression? Postpartum depression is a mood disorder that occurs after giving birth  A mood is an emotion or a feeling  Moods affect your behavior and how you feel about yourself and life in general  Depression is a sad mood that you cannot control  Women often feel sad, afraid, or nervous after their baby is born  These feelings are called postpartum blues or baby blues, and they usually go away in 1 to 2 weeks  With postpartum depression, these symptoms get worse and continue for more than 2 weeks  Postpartum depression is a serious condition that affects your daily activities and relationships  What causes postpartum depression? Healthcare providers do not know exactly what causes postpartum depression  It may be caused by a sudden drop in hormone levels after childbirth  A previous episode of postpartum depression or a family history of depression may increase your risk  Several things may trigger postpartum depression:  · Lack of support from the baby's father or other family members    · Feeling more tired than usual    · Stress, a poor diet, or lack of sleep    · Pain after childbirth or pain during breastfeeding    · Sudden change in lifestyle  How is postpartum depression diagnosed? Postpartum depression affects your daily activities and your relationships with other people  Healthcare providers will ask you questions about your signs and symptoms and how they are affecting your life  The symptoms of postpartum depression usually begin within 1 month after childbirth  You feel depressed or lose interest in activities you enjoy nearly every day for at least 2 weeks  You also have 4 or more of the following symptoms:  · You feel tired or have less energy than usual      · You feel unimportant or guilty most of the time  · You think about hurting or killing yourself  · Your appetite changes  You may lose your appetite and lose weight without trying   Your appetite may also increase and you may gain weight  · You are restless, irritable, or withdrawn  · You have trouble concentrating and remembering things  You have trouble doing daily tasks or making decisions  · You have trouble sleeping, even after the baby is asleep  How is postpartum depression treated? · Psychotherapy:  During therapy, you will talk with healthcare providers about how to cope with your feelings and moods  This can be done alone or in a group  It may also be done with family members or your partner  · Antidepressants: This medicine is given to decrease or stop the symptoms of depression  You usually need to take antidepressants for several weeks before you begin to feel better  Do not stop taking antidepressants unless your healthcare provider tells you to  Healthcare providers may try a different antidepressant if one type does not work  What can I do to feel better? · Rest:  Do not try to do everything all at the same time  Do only what is needed and let other things wait until later  Ask your family or friends for help, especially if you have other children  Ask your partner to help with night feedings or other baby care  Try to sleep when the baby naps  · Get emotional support:  Share your feelings with your partner, a friend, or another mother  · Take care of yourself:  Shower and dress each day  Do not skip meals  Try to get out of the house a little each day  Get regular exercise  Eat a healthy diet  Avoid alcohol because it can make your depression worse  Do not isolate yourself  Go for a walk or meet with a friend  It is also important that you have some time by yourself each day  How do I find support and more information?    · [address] [place] [address], Public Information & Communication Branch  [address], [address], [address]  [place] [address]   Phone: [phone]  Phone: [phone]  Web Address: [facility]  When should I contact my healthcare provider? · You cannot make it to your next visit  · Your depression does not get better with treatment or it gets worse  · You have questions or concerns about your condition or care  When should I seek immediate care or call 911? · You think about hurting or killing yourself, your baby, or someone else  · You feel like other people want to hurt you  · You hear voices telling you to hurt yourself or your baby  CARE AGREEMENT:   You have the right to help plan your care  Learn about your health condition and how it may be treated  Discuss treatment options with your caregivers to decide what care you want to receive  You always have the right to refuse treatment  The above information is an  only  It is not intended as medical advice for individual conditions or treatments  Talk to your doctor, nurse or pharmacist before following any medical regimen to see if it is safe and effective for you  © 2017 [address] Information is for End User's use only and may not be sold, redistributed or otherwise used for commercial purposes  All illustrations and images included in CareNotes® are the copyrighted property of [employer]  or Eyad Amaral  Postpartum Perineal Care   WHAT YOU NEED TO KNOW:   Postpartum perineal care is care for your perineum after you have a baby  The perineum is your vagina and anus  DISCHARGE INSTRUCTIONS:   Care for your perineum:  Healthcare providers will give you a small squirt bottle and show you how to use it   Do the following after you use the toilet and before you put on a new pad:  · Remove the soiled pad    · Use the squirt bottle to rinse your perineum from front to back while you sit on the toilet     · Pat the area dry from front to back with toilet paper or a cotton cloth     · Put on a fresh pad    · Wash your hands  Decrease pain:  Ask your healthcare provider about these and other ways to decrease perineal pain:  · Sitz baths:  Healthcare providers may give you a portable sitz bath  This is a small tub that fits in the toilet  Fill the sitz bath or bathtub with 4 to 6 inches of warm water  Sit in the warm water for 20 minutes 2 to 3 times a day  · Ice:  Ice helps decrease swelling and pain  Ice may also help prevent tissue damage  Use an ice pack, or put crushed ice in a plastic bag  Cover it with a towel and place it on your perineum for 15 to 20 minutes every hour, or as directed  · Medicine spray, wipes, or pads:  Healthcare providers may give you a medicine spray or wipes soaked with numbing medicine to decrease the pain  Pads that contain an herb called witch hazel may also help reduce pain  Use these after perineal care or a sitz bath  Follow up with your healthcare provider as directed:  Write down your questions so you remember to ask them during your visits  Contact your healthcare provider if:   · You have heavy vaginal bleeding that fills 1 or more sanitary pads in 1 hour  · You have foul-smelling vaginal discharge  · You feel weak or lightheaded  · You have questions or concerns about your condition or care  Seek care immediately or call 911 if:   · You have large blood clots or bright red blood coming from your vagina  · You have abdominal pain, vomiting, and a fever  © 2017 [address] Information is for End User's use only and may not be sold, redistributed or otherwise used for commercial purposes  All illustrations and images included in CareNotes® are the copyrighted property of [employer]  or Eyad Amaral  The above information is an  only  It is not intended as medical advice for individual conditions or treatments  Talk to your doctor, nurse or pharmacist before following any medical regimen to see if it is safe and effective for you        Self Care After Delivery   AMBULATORY CARE:   The postpartum period  is the period of time from delivery to about 6 weeks  During this time you may experience many physical and emotional changes  It is important to understand what is normal and when you need to call your healthcare provider  It is also important to know how to care for yourself during this time  Call 911 for any of the following:   · You soak through 1 pad in 15 minutes, have blurry vision, clammy or pale skin, and feel faint  · You faint or lose consciousness  · Your heart is beating faster than normal      · You have trouble breathing  · You cough up blood  Seek care immediately if:   · You soak through 1 or more pads in an hour, or pass blood clots larger than a quarter from your vagina  · Your leg is painful, red, and larger than normal      · You have severe abdominal pain  · You have a bad headache or changes in your vision  · Your episiotomy or C section incision is red, swollen, bleeding, or draining pus  · Your incision comes apart  · You see or hear things that are not there, or have thoughts of harming yourself or your baby  Contact your obstetrician or midwife if:   · You have a fever  · You have new or worsening pain in your abdomen or vagina  · You continue to have the baby blues 10 days after you deliver  · You have trouble sleeping  · You have foul-smelling discharge from your vagina  · You have pain or burning when you urinate  · You do not have a bowel movement for 3 days or more  · You have nausea or are vomiting  · You have hard lumps or red streaks over your breasts  · You have cracked nipples or bleed from your nipples  · You have questions or concerns about your condition or care  Physical changes:   The following are normal changes after you give birth:  · Pain in the area between your anus and vagina    · Breast pain    · Constipation or hemorrhoids    · Hot or cold flashes    · Vaginal bleeding or discharge    · Mild to moderate abdominal cramping    · Difficulty controlling bowel movements or urine  Emotional changes: The following are symptoms of the baby blues, or normal emotions after you give birth  The changes in your emotions may be caused by a drop in hormone levels after you deliver  If these symptoms last longer than 1 to 2 weeks after you give birth, you may have postpartum depression  · Feeling irritable    · Feeling sad    · Crying for no reason    · Feeling anxious  Breast care for nursing mothers: You may have sore breasts for 3 to 6 days after you give birth  This happens as your milk begins to fill your breasts  You may also have sore breasts if you do not breastfeed frequently  Do the following to care for your breasts:  · Apply a moist, warm, compress to your breast as directed  This may help soothe your breasts  Make sure the washcloth is not too hot before you apply it to your breast      · Nurse your baby or pump your milk frequently  This may prevent clogged milk ducts  Ask your healthcare provider how often to nurse or pump  · Massage your breasts as directed  This may help increase your milk flow  Gently rub your breasts in a circular motion before you breastfeed  You may need to gently squeeze your breast or nipple to help release milk  You can also use a breast pump to help release milk from your breast      · Wash your breasts with warm water only  Do not put soap on your nipples  Soap may cause your nipples to become dry  · Apply lanolin cream to your nipples as directed  Lanolin cream may add moisture to your skin and prevent nipple dryness  Always  wash off lanolin cream with warm water before you breastfeed  · Place pads in your bra  Your nipples may leak milk when you are not breastfeeding  You can place pads inside of your bra to help prevent leaking onto your clothing  Ask your healthcare provider where to purchase bra pads  · Get breastfeeding support if needed    There are healthcare providers who can answer questions about breastfeeding and provide you with support  Ask your healthcare provider who you can contact if you need breastfeeding support  Breast care for non-nursing mothers:  Milk will fill your breasts even if you bottle feed your baby  Do the following to help stop your milk from filling your breasts and causing pain:  · Wear a bra with support at all times  A sports bra or a tight-fitting bra will help stop your milk from coming in  · Apply ice on each breast for 15 to 20 minutes every hour or as directed  Use an ice pack, or put crushed ice in a plastic bag  Cover it with a towel  Ice helps your milk ducts shrink  · Keep your breasts away from warm water  Warm water will make it easier for milk to fill your breasts  Stand with your breasts away from warm water in the shower  · Limit how much you touch your breasts  This will prevent them from filling with milk  Perineum care: Your perineum is the area between your rectum and vagina  It is normal to have swelling and pain in this area after you give birth  If you had an episiotomy, your healthcare provider may give you special instructions  · Clean your perineum after you use the bathroom  This may prevent infection and help with healing  Use a spray bottle with warm water to clean your perineum  You may also gently spray warm water against your perineum when you urinate  Always wipe front to back  · Take a sitz bath as directed  A sitz bath may help relieve swelling and pain  Fill your bath tub or bucket with water up to your hips and sit in the water  Use cold water for 2 days after you deliver  Then use warm water  Ask your healthcare provider for more information about a sitz bath  · Apply ice packs for the first 24 hours or as directed  Use a plastic glove filled with ice or buy an ice pack  Wrap the ice pack or plastic glove in a small towel or wash cloth   Place the ice pack on your perineum for 20 minutes at a time  · Sit on a donut-shaped pillow  This may relieve pressure on your perineum when you sit  · Use wipes with medicine or take pills as directed  Your healthcare provider may tell you to use witch hazel pads  You can place witch hazel pads in the refrigerator before you apply them to your perineum  He may also tell you to take NSAIDs  Ask your healthcare provider how often to take pills or use wipes with medicine  · Do not go swimming or take tub baths for 4 to 6 weeks or as directed  This will help prevent an infection in your vagina or uterus  Bowel and bladder care: It may take 3 to 5 days to have a bowel movement after you deliver your baby  You can do the following to prevent or manage constipation, and get control of your bowel or bladder:  · Take stool softeners as directed  A stool softener is medicine that will make your bowel movements softer  This may prevent or relieve constipation  A stool softener may also make bowel movements less painful  · Drink plenty of liquids  Ask how much liquid to drink each day and which liquids are best for you  Liquids may help prevent constipation  · Eat foods high in fiber  Examples include fruits, vegetables, grains, beans, and lentils  Ask your healthcare provider how much fiber you need each day  Fiber may prevent constipation  · Do Kegel exercises as directed  Kegel exercises will help strengthen the muscles that control bowel movements and urination  Ask your healthcare provider for more information on Kegel exercises  · Apply cold compresses or medicine to hemorrhoids as directed  This may relieve swelling and pain  Your healthcare provider may tell you to apply ice or wipes with medicine to your hemorrhoids  He may also tell you to use a sitz bath  Ask your healthcare for more information on how to manage hemorrhoids  Nutrition:  Good nutrition is important in the postpartum period   It will help you return to a healthy weight, increase your energy levels, and prevent constipation  It will also help you get enough nutrients and calories if you are going to breastfeed your baby  · Eat a variety of healthy foods  Healthy foods include fruits, vegetables, whole-grain breads, low-fat dairy products, beans, lean meats, and fish  You may need 500 to 700 additional calories each day if you breastfeed your baby  You may also need extra protein  · Limit foods with added sugar and high amounts of fat  These foods are high in calories and low in healthy nutrients  Read food labels so you know how much sugar and fat is in the food you want to eat  · Drink 8 to 10 glasses of water per day  Water will help you make plenty of milk for your baby  It will also help prevent constipation  Drink a glass of water every time you breastfeed your baby  · Take vitamins as directed  Ask your healthcare provider what vitamins you need  · Limit caffeine and alcohol if you are breastfeeding  Caffeine and alcohol can get into your breast milk  Caffeine and alcohol can make your baby fussy  They can also interfere with your baby's sleep  Ask your healthcare provider if you can drink alcohol or caffeine  Rest and sleep: You may feel very tired in the postpartum period  Enough sleep will help you heal and give you energy to care for your baby  The following may help you get sleep and rest:  · Nap when your baby naps  Your baby may nap several times during the day  Get rest during this time  · Limit visitors  Many people may want to see you and your baby  Ask friends or family to visit on different days  This will give you time to rest      · Do not plan too much for one day  Put off household chores so that you have time to rest  Gradually do more each day  · Ask for help from family, friends, or neighbors  Ask them to help you with laundry, cleaning, or errands   Also ask someone to watch the baby while you take a nap or relax  Ask your partner to help with the care of your baby  Pump some of your breast milk so your partner can feed your baby during the night  Exercise after delivery:  Wait until your healthcare provider says it is okay to exercise  Exercise can help you lose weight, increase your energy levels, and manage your mood  It can also prevent constipation and blood clots  Start with gentle exercises such as walking  Do more as you have more energy  You may need to avoid abdominal exercises for 1 to 2 weeks after you deliver  Talk to your healthcare provider about an exercise plan that is right for you  Sexual activity after delivery:   · Do not have sex until your healthcare provider says it is okay  You may need to wait 4 to 6 weeks before you have sex  This may prevent infection and allow time to heal      · Your menstrual cycle may begin as soon as 3 weeks after you deliver  Your period may be delayed if you breastfeed your baby  You can become pregnant before you get your first postpartum period  Talk to your healthcare provider about birth control that is right for you  Some types of birth control are not safe during breastfeeding  For support and more information:  Join a support group for new mothers  Ask for help from family and friends with chores, errands, and care of your baby  · Office of Women's Health,  Department of Health and Human Services  [address], [id number] [place] [address]  [address], [id number] [place] [address]  Phone: [phone]  Web Address: www womenshealth gov  · March of [facility] Postpartum [address] [facility] , [address] [place] [address]  [address] [facility] , [address] [place] [address]  Web Address: [email or web address]  [email or web address]  [email or web address]  Follow up with your obstetrician or midwife as directed: You will need to follow up with your healthcare provider in 2 to 6 weeks after delivery   Write down your questions so you remember to ask them at your visits  © 2017 [employer] [employer] Information is for End User's use only and may not be sold, redistributed or otherwise used for commercial purposes  All illustrations and images included in CareNotes® are the copyrighted property of A [employer] M [employer]  or Eyad Amaral  The above information is an  only  It is not intended as medical advice for individual conditions or treatments  Talk to your doctor, nurse or pharmacist before following any medical regimen to see if it is safe and effective for you

## 2018-11-06 NOTE — OB LABOR/OXYTOCIN SAFETY PROGRESS
Oxytocin Safety Progress Check Note - Nevaeh Mcfarland 41 y o  female MRN: [id number]    Unit/Bed#: -01 Encounter: [id number]    Obstetric History       T1      L1     SAB1   TAB0   Ectopic0   Multiple0   Live Births1      Gestational Age: 38w7d  Dose (katelyn-units/min) Oxytocin: 10 katelyn-units/min  Contraction Frequency (minutes): 1-3  Contraction Quality: Moderate  Tachysystole: No   Dilation: 8        Effacement (%): 90  Station: -1  Baseline Rate: 150 bpm  Fetal Heart Rate: 148 BPM  FHR Category: Category II          Notes/comments:     Patient was feeling more pressure, SVE unchanged  Cat II with intermittent early and late decelerations, but overall reassuring  Moderate variability and accelerations present  Tracing reviewed w/ Dr Wilfredo Baer and Dr Edwin Moore  Patient also had a temp of 100 5 at 0215  She had just received tylenol fort a HA that has now improved  Will recheck temp in 30 mins  Will continue titrating pitocin and continue to monitor closely      Jaime Menendez MD 2018 2:46 AM

## 2018-11-06 NOTE — OB LABOR/OXYTOCIN SAFETY PROGRESS
Labor Progress Note - Johnnie Hale 41 y o  female MRN: [id number]    Unit/Bed#:  305-01 Encounter: [id number]    Obstetric History       T1      L1     SAB1   TAB0   Ectopic0   Multiple0   Live Births1      Gestational Age: 44w7d     Contraction Frequency (minutes): 4  Contraction Quality: Moderate  Tachysystole: No   Dilation: 8        Effacement (%): 90  Station: -1  Baseline Rate: 140 bpm  Fetal Heart Rate: 145 BPM  FHR Category: Category I          Notes/comments:      AROM clear fluid  Comfortable with epidural      Continue to monitor       Raphael Stokes MD 2018 7:51 PM

## 2018-11-06 NOTE — OB LABOR/OXYTOCIN SAFETY PROGRESS
Labor Progress Note - Rip Sapna 41 y o  female MRN: [id number]    Unit/Bed#: -01 Encounter: [id number]    Obstetric History       T1      L1     SAB1   TAB0   Ectopic0   Multiple0   Live Births1      Gestational Age: 38w7d     Contraction Frequency (minutes): 1 5-4  Contraction Quality: Moderate  Tachysystole: No   Dilation: 8        Effacement (%): 90  Station: -1  Baseline Rate: 135 bpm  Fetal Heart Rate: 132 BPM  FHR Category: Category I          Notes/comments:     SVE unchanged  IUPC placed  Will start pitocin if contractions not adequate to a goal of 200-250 MVUs  Cat I tracing  Dr Lianet Dangelo aware      Lucinda Chen MD 2018 12:21 AM

## 2018-11-06 NOTE — DISCHARGE SUMMARY
Discharge Summary - OB/GYN   Nelly Watts 41 y o  female MRN: [id number]  Unit/Bed#: LD PACU- Encounter: [id number]      Admission Date: 2018     Discharge Date: 2018    Admitting Diagnosis:   1  Pregnancy at 38w6d  2  HSV on Valtrex    Discharge Diagnosis:   Same, delivered  Arrest of dilation     Procedures: primary  section, low transverse incision    Admitting Attending: Ángel Omalley MD    Delivering Attending: Dr Robina Avila    Discharging Attending: Dr Vira Arroyo Course:     Nelyl Watts is a 41 y o  [id number] at 38w6d wks who was initially admitted for contractions  She received an epidural for analgesia and was artificially ruptured  She received pitocin for augmentation, but failed to progress past 8cm of dilation  Decision was made to proceed to OR for 1LTCS  She delivered a viable male  on 18 at [facility]  Weight 8lbs 5oz via primary  section, low transverse incision  Apgars were 8 (1 min) and 9 (5 min)   was transferred to  nursery  Patient tolerated the procedure well and was transferred to recovery in stable condition  Her post-operative course was uncomplicated  Preoperative hemaglobin was 11 9, postoperative was 8 3  Her postoperative pain was well controlled with oral analgesics  On day of discharge, she was ambulating and able to reasonably perform all ADLs  She was voiding and had appropriate bowel function  Pain was well controlled  She was discharged home on post-operative day #3 without complications  Patient was instructed to follow up with her OB as an outpatient and was given appropriate warnings to call provider if she develops signs of infection or uncontrolled pain  Complications: none apparent    Condition at discharge: good     Discharge instructions/Information to patient and family:   See after visit summary for information provided to patient and family        Provisions for Follow-Up Care:  See after visit summary for information related to follow-up care and any pertinent home health orders  Disposition: Home    Planned Readmission: No    Discharge Medications: For a complete list of the patient's medications, please refer to her med rec

## 2018-11-06 NOTE — PROGRESS NOTES
Post-op Note - OB/GYN   Aria Sosa 41 y o  female MRN: [id number]  Unit/Bed#: -01 Encounter: [id number]    Assessment:  41 y o  [id number] postop day 0 status post primary low-transverse  section for arrest of dilation  Patient vitally stable  Urine output and 92 cc/hour  Patient doing well postoperatively    Plan:  Pain management prn  Follow-up a m  CBC  Continue to monitor urine output- consideration for Sherman removal at 6:00 p m  This evening if patient is willing to ambulate  Encourage ambulation  Encourage incentive spirometry  Encourage breastfeeding    Subjective/Objective   Subjective:     Pain: no  Tolerating PO: yes  Voiding: Sherman in place  Flatus: no  BM: no  Ambulating: no  Chest pain: no  Shortness of breath: no  Leg pain: no    Objective:     Vitals: Blood pressure 106/55, pulse 90, temperature 98 5 °F (36 9 °C), temperature source Oral, resp  rate 18, height 5' 1" (1 549 m), weight 70 3 kg (155 lb), last menstrual period 01/15/2018, SpO2 96 %, currently breastfeeding  Physical Exam:     General: AAOx3, NAD  Cardia: CV, RRR  Resp: CTA b/l, no WRR  Abdomen: soft, non-tender, non-distended, no rebound or guarding  Incision:  Closed with running absorbable sutures in overlying histoacryl, pressure dressing a top clean, dry, and intact  Extremities:  Non tender, negative Jair's bilaterally    Lab, Imaging and other studies: I have personally reviewed pertinent reports        Lab Results   Component Value Date    WBC 6 51 2018    HGB 11 9 2018    HCT 38 1 2018    MCV 85 2018     2018               Lawrance Skiff, MD  18

## 2018-11-06 NOTE — ANESTHESIA POSTPROCEDURE EVALUATION
Post-Op Assessment Note      CV Status:  Stable    Mental Status:  Alert and awake    Hydration Status:  Euvolemic    PONV Controlled:  Controlled    Airway Patency:  Patent    Post Op Vitals Reviewed: Yes          Staff: Anesthesiologist     Post-op block assessment: catheter intact, no complications and site cleaned        /53 (11/06/18 0652)    Temp      Pulse (!) 115 (11/06/18 0652)   Resp 18 (11/06/18 0652)    SpO2 100 % (11/06/18 8830)

## 2018-11-07 LAB
BASOPHILS # BLD AUTO: 0.02 THOUSANDS/ΜL (ref 0–0.1)
BASOPHILS NFR BLD AUTO: 0 % (ref 0–1)
EOSINOPHIL # BLD AUTO: 0.11 THOUSAND/ΜL (ref 0–0.61)
EOSINOPHIL NFR BLD AUTO: 1 % (ref 0–6)
ERYTHROCYTE [DISTWIDTH] IN BLOOD BY AUTOMATED COUNT: 16.7 % (ref 11.6–15.1)
HCT VFR BLD AUTO: 24 % (ref 34.8–46.1)
HGB BLD-MCNC: 7.3 G/DL (ref 11.5–15.4)
IMM GRANULOCYTES # BLD AUTO: 0.11 THOUSAND/UL (ref 0–0.2)
IMM GRANULOCYTES NFR BLD AUTO: 1 % (ref 0–2)
LYMPHOCYTES # BLD AUTO: 1.83 THOUSANDS/ΜL (ref 0.6–4.47)
LYMPHOCYTES NFR BLD AUTO: 14 % (ref 14–44)
MCH RBC QN AUTO: 27.4 PG (ref 26.8–34.3)
MCHC RBC AUTO-ENTMCNC: 30.4 G/DL (ref 31.4–37.4)
MCV RBC AUTO: 90 FL (ref 82–98)
MONOCYTES # BLD AUTO: 0.74 THOUSAND/ΜL (ref 0.17–1.22)
MONOCYTES NFR BLD AUTO: 6 % (ref 4–12)
NEUTROPHILS # BLD AUTO: 10.44 THOUSANDS/ΜL (ref 1.85–7.62)
NEUTS SEG NFR BLD AUTO: 78 % (ref 43–75)
NRBC BLD AUTO-RTO: 0 /100 WBCS
PLATELET # BLD AUTO: 152 THOUSANDS/UL (ref 149–390)
PMV BLD AUTO: 11 FL (ref 8.9–12.7)
RBC # BLD AUTO: 2.66 MILLION/UL (ref 3.81–5.12)
WBC # BLD AUTO: 13.25 THOUSAND/UL (ref 4.31–10.16)

## 2018-11-07 PROCEDURE — 85025 COMPLETE CBC W/AUTO DIFF WBC: CPT | Performed by: OBSTETRICS & GYNECOLOGY

## 2018-11-07 RX ADMIN — IBUPROFEN 600 MG: 600 TABLET ORAL at 23:22

## 2018-11-07 RX ADMIN — DOCUSATE SODIUM 100 MG: 100 CAPSULE, LIQUID FILLED ORAL at 17:37

## 2018-11-07 RX ADMIN — IBUPROFEN 600 MG: 600 TABLET ORAL at 15:40

## 2018-11-07 RX ADMIN — DOCUSATE SODIUM 100 MG: 100 CAPSULE, LIQUID FILLED ORAL at 09:06

## 2018-11-07 RX ADMIN — IBUPROFEN 600 MG: 600 TABLET ORAL at 09:06

## 2018-11-07 RX ADMIN — KETOROLAC TROMETHAMINE 30 MG: 30 INJECTION, SOLUTION INTRAMUSCULAR at 02:07

## 2018-11-07 NOTE — PROGRESS NOTES
Progress Note - OB/GYN   Polo Gatica 41 y o  female MRN: [id number]  Unit/Bed#:  313-01 Encounter: [id number]    Assessment:  Post operative Day #1 s/p 1LTCS, stable, baby in room    Plan:  1) Anemia of acute blood loss   AM Hgb 8 3   Asymptomatic   Tachycardia resolved   Voiding appropriately  2) Continue routine post partum care   Encourage ambulation   Encourage breastfeeding   Anticipate discharge POD 3     Subjective/Objective   Chief Complaint:     Post delivery  Patient is doing well  Lochia WNL  Pain well controlled  Subjective:     Pain: yes, cramping, improved with meds  Tolerating PO: yes  Voiding: yes  Flatus: yes  BM: no  Ambulating: yes  Breastfeeding:  yes  Chest pain: no  Shortness of breath: no  Leg pain: no  Lochia: minimal    Objective:     Vitals: /53 (BP Location: Right arm)   Pulse 101   Temp 98 3 °F (36 8 °C) (Oral)   Resp 18   Ht 5' 1" (1 549 m)   Wt 70 3 kg (155 lb)   LMP 01/15/2018 (Within Weeks)   SpO2 96%   Breastfeeding? Yes   BMI 29 29 kg/m²       Intake/Output Summary (Last 24 hours) at 11/07/18 0715  Last data filed at 11/07/18 0329   Gross per 24 hour   Intake                0 ml   Output             2440 ml   Net            -2440 ml       Lab Results   Component Value Date    WBC 13 25 (H) 11/07/2018    HGB 7 3 (L) 11/07/2018    HCT 24 0 (L) 11/07/2018    MCV 90 11/07/2018     11/07/2018       Physical Exam:     Gen: AAOx3, NAD  CV: RRR  Lungs: CTA b/l  Abd: Soft, non-tender, non-distended, no rebound or guarding  Incision claen, dry, intact; no signs of bleeding, induration, or ecchymosis  Uterine fundus firm and non-tender, 2 cm below the umbilicus     Ext: Non tender    Samanta Jean-Baptiste MD  11/7/2018  7:15 AM

## 2018-11-07 NOTE — LACTATION NOTE
This note was copied from a baby's chart  Call for feeding assistance at this time  Mom and Dad voicing concerns that infant hasn't eaten well since 0530, but had good feeds prior to this  Mom has infant positioned properly at this time  Demonstrated hand expression in order to encourage latch  Infant latches with a few sucks but falls asleep  Discussed with Mom infant patterns in the first 24-48 hours of life and hand expressing into infant's mouth in order to ensure some intake  Discussed goals for wet and soiled diapers and 24 hour labs to follow infant nutritional status  Enc to call for additional lactation support as needed throughout stay

## 2018-11-08 DIAGNOSIS — B00.9 HERPES SIMPLEX VIRUS INFECTION: ICD-10-CM

## 2018-11-08 PROCEDURE — 99024 POSTOP FOLLOW-UP VISIT: CPT | Performed by: OBSTETRICS & GYNECOLOGY

## 2018-11-08 RX ORDER — ACETAMINOPHEN 325 MG/1
650 TABLET ORAL EVERY 4 HOURS PRN
Status: DISCONTINUED | OUTPATIENT
Start: 2018-11-08 | End: 2018-11-09 | Stop reason: HOSPADM

## 2018-11-08 RX ADMIN — ACETAMINOPHEN 650 MG: 325 TABLET, FILM COATED ORAL at 10:13

## 2018-11-08 RX ADMIN — ACETAMINOPHEN 650 MG: 325 TABLET, FILM COATED ORAL at 20:22

## 2018-11-08 RX ADMIN — IBUPROFEN 600 MG: 600 TABLET ORAL at 08:50

## 2018-11-08 RX ADMIN — ACETAMINOPHEN 650 MG: 325 TABLET, FILM COATED ORAL at 14:28

## 2018-11-08 RX ADMIN — DOCUSATE SODIUM 100 MG: 100 CAPSULE, LIQUID FILLED ORAL at 17:12

## 2018-11-08 RX ADMIN — DOCUSATE SODIUM 100 MG: 100 CAPSULE, LIQUID FILLED ORAL at 08:50

## 2018-11-08 RX ADMIN — IBUPROFEN 600 MG: 600 TABLET ORAL at 16:15

## 2018-11-08 NOTE — PROGRESS NOTES
Progress Note - OB/GYN   Aira Sosa 41 y o  female MRN: [id number]  Unit/Bed#:  313-01 Encounter: [id number]    Assessment:  41 y o  [id number] s/p Primary low transverse  section for arrest of dilation post-operative day 2  Patient recovering well, Stable  Possibly wanting discharge this afternoon- to discuss with attending      POD #1  Continue routine post partum care  Pain management PRN  Encourage ambulation  Encourage breastfeeding    Acute blood loss anemia:  Hg 11 9 -> 8 3 -> 7 3  Asymptomatic    Subjective/Objective   Chief Complaint:    Postpartum state    Subjective:   Patient reports she is doing well without complaint this AM   Pain: yes, incisional, improved with meds  Tolerating PO: yes  Voiding: yes  Flatus: yes  BM: yes  Ambulating: yes  Breastfeeding:  yes  Chest pain: no  Shortness of breath: no  Leg pain: no  Lochia: minimal    Objective:     Vitals: Temp:  [98 3 °F (36 8 °C)-98 7 °F (37 1 °C)] 98 7 °F (37 1 °C)  HR:  [79-95] 79  Resp:  [16-18] 16  BP: (100-114)/(53-60) 110/60     Physical Exam:   General: NAD, alert, oriented  Cardio: Regular rate and rhythm, no murmur  Resp: nonlabored breathing, clear to auscultation bilaterally  Abdomen: Soft, no distension/rebound/guarding, appropriately tender  Fundus: Firm, non-tender, fundus: at umbilicus  Incision: Closed with running absorbable sutures and overlying histoacryl C/D/I  G/U: minimal lochia noted on pad  Lower Extremities: Non-tender, no palpable cords    Medications:  Current Facility-Administered Medications   Medication Dose Route Frequency    acetaminophen (TYLENOL) tablet 650 mg  650 mg Oral Q6H PRN    benzocaine-menthol-lanolin-aloe (DERMOPLAST) 20-0 5 % topical spray 1 application  1 application Topical F8D PRN    calcium carbonate (TUMS) chewable tablet 1,000 mg  1,000 mg Oral Daily PRN    docusate sodium (COLACE) capsule 100 mg  100 mg Oral BID    fentaNYL (SUBLIMAZE) injection 50 mcg  50 mcg Intravenous Q3 min PRN    hydrocortisone 1 % cream 1 application  1 application Topical Daily PRN    HYDROmorphone (DILAUDID) injection 0 2 mg  0 2 mg Intravenous Q5 Min PRN    HYDROmorphone (DILAUDID) injection 0 5 mg  0 5 mg Intravenous Q2H PRN    HYDROmorphone (DILAUDID) injection 1 mg  1 mg Intravenous Q2H PRN    ibuprofen (MOTRIN) tablet 600 mg  600 mg Oral Q6H PRN    lactated ringers infusion  125 mL/hr Intravenous Continuous    ondansetron (ZOFRAN) injection 4 mg  4 mg Intravenous Once PRN    oxyCODONE-acetaminophen (PERCOCET) 5-325 mg per tablet 1 tablet  1 tablet Oral Q4H PRN    oxyCODONE-acetaminophen (PERCOCET) 5-325 mg per tablet 2 tablet  2 tablet Oral Q4H PRN    ropivacaine 0 2% PCEA   Epidural Continuous    simethicone (MYLICON) chewable tablet 80 mg  80 mg Oral 4x Daily PRN    witch hazel-glycerin (TUCKS) topical pad 1 pad  1 pad Topical Q4H PRN     Hunter Contreras  11/8/2018  6:36 AM

## 2018-11-08 NOTE — LACTATION NOTE
This note was copied from a baby's chart  In room as supplementation was beginning to be discussed due to lack of documented voids for infant  At end of discussion, infant voided  Encouraged Ellie Arboleda to call for assistance, questions, and concerns about breastfeeding  Extension provided

## 2018-11-09 VITALS
RESPIRATION RATE: 20 BRPM | DIASTOLIC BLOOD PRESSURE: 66 MMHG | HEART RATE: 72 BPM | SYSTOLIC BLOOD PRESSURE: 110 MMHG | HEIGHT: 61 IN | OXYGEN SATURATION: 96 % | WEIGHT: 155 LBS | TEMPERATURE: 98 F | BODY MASS INDEX: 29.27 KG/M2

## 2018-11-09 PROBLEM — Z34.92 SUPERVISION OF LOW-RISK PREGNANCY, SECOND TRIMESTER: Status: RESOLVED | Noted: 2018-06-28 | Resolved: 2018-11-09

## 2018-11-09 PROBLEM — R82.71 ASYMPTOMATIC BACTERIURIA DURING PREGNANCY: Status: RESOLVED | Noted: 2018-05-02 | Resolved: 2018-11-09

## 2018-11-09 PROBLEM — O99.891 ASYMPTOMATIC BACTERIURIA DURING PREGNANCY: Status: RESOLVED | Noted: 2018-05-02 | Resolved: 2018-11-09

## 2018-11-09 PROBLEM — Z23 FLU VACCINE NEED: Status: RESOLVED | Noted: 2018-09-24 | Resolved: 2018-11-09

## 2018-11-09 PROBLEM — Z3A.28 28 WEEKS GESTATION OF PREGNANCY: Status: RESOLVED | Noted: 2018-06-28 | Resolved: 2018-11-09

## 2018-11-09 PROBLEM — O09.523 SUPERVISION OF ELDERLY MULTIGRAVIDA IN THIRD TRIMESTER: Status: RESOLVED | Noted: 2018-05-07 | Resolved: 2018-11-09

## 2018-11-09 PROBLEM — Z3A.38 38 WEEKS GESTATION OF PREGNANCY: Status: RESOLVED | Noted: 2018-11-05 | Resolved: 2018-11-09

## 2018-11-09 PROBLEM — Z3A.34 34 WEEKS GESTATION OF PREGNANCY: Status: RESOLVED | Noted: 2018-10-09 | Resolved: 2018-11-09

## 2018-11-09 PROBLEM — O09.523 ENCOUNTER FOR SUPERVISION OF ELDERLY MULTIGRAVIDA IN THIRD TRIMESTER, ANTEPARTUM: Status: RESOLVED | Noted: 2018-10-26 | Resolved: 2018-11-09

## 2018-11-09 RX ORDER — IBUPROFEN 600 MG/1
600 TABLET ORAL EVERY 6 HOURS PRN
Qty: 30 TABLET | Refills: 0
Start: 2018-11-09

## 2018-11-09 RX ORDER — VALACYCLOVIR HYDROCHLORIDE 500 MG/1
500 TABLET, FILM COATED ORAL 2 TIMES DAILY
Qty: 60 TABLET | Refills: 0 | OUTPATIENT
Start: 2018-11-09 | End: 2018-11-09 | Stop reason: HOSPADM

## 2018-11-09 RX ORDER — DOCUSATE SODIUM 100 MG/1
100 CAPSULE, LIQUID FILLED ORAL 2 TIMES DAILY
Qty: 10 CAPSULE | Refills: 0
Start: 2018-11-09

## 2018-11-09 RX ADMIN — DOCUSATE SODIUM 100 MG: 100 CAPSULE, LIQUID FILLED ORAL at 07:47

## 2018-11-09 RX ADMIN — IBUPROFEN 600 MG: 600 TABLET ORAL at 06:19

## 2018-11-09 NOTE — LACTATION NOTE
This note was copied from a baby's chart  Met with mother to go over feeding log since birth for the first week  Emphasized 8 or more (12) feedings in a 24 hour period, what to expect for the number of diapers per day of life and the progression of properties of the  stooling pattern  Discussed s/s that breastfeeding is going well after day 4 and when to get help from a pediatrician or lactation support person after day 4  Booklet included Breast Pumping Instructions, When You Go Back to Work or School, and Breastfeeding Resources for after discharge including access to the number for the [employer]  Discussed s/s engorgement and how to manage with medications and cool compresses as well as s/s mastitis and when to contact physician  Talked with MOB about paced feeding along with hand out on positioning and rational to assist infant making transition back and forth between breast and bottle feeding more effective  Mom BF at this time, minor adjustment in positioning belly to belly with one hand on each side of Mom's breast  Infant latches deeper at this time, Mom and Dad state they feel this looks more comfortable for baby & Mom  Mom re latches infant independently several times throughout consult  Enc putting infant to the breast prior to offering supplementation  No other needs expressed at this time  Enc to call for any further feeding assistance as needed and to contact [facility] for lactation needs at home

## 2018-11-09 NOTE — PROGRESS NOTES
Progress Note - OB/GYN   Polo Gatica 41 y o  female MRN: [id number]  Unit/Bed#: -01 Encounter: [id number]    Assessment:  Post operative Day #3 s/p 1LTCS, stable, baby in room    Plan:  1) Continue routine post partum care   Encourage ambulation   Encourage breastfeeding   Anticipate discharge today     Subjective/Objective   Chief Complaint:     Post delivery  Patient is doing well  Lochia WNL  Pain well controlled  Patient is eager to get home    Subjective:     Pain: yes, cramping, improved with meds  Tolerating PO: yes  Voiding: yes  Flatus:  Yes  BM: Yes  Ambulating: yes  Breastfeeding:  Yes  Chest pain: no  Shortness of breath: no  Leg pain: no  Lochia: minimal    Objective:     Vitals: /66   Pulse 72   Temp 98 °F (36 7 °C) (Oral)   Resp 20   Ht 5' 1" (1 549 m)   Wt 70 3 kg (155 lb)   LMP 01/15/2018 (Within Weeks)   SpO2 96%   Breastfeeding? Yes   BMI 29 29 kg/m²     No intake or output data in the 24 hours ending 11/09/18 0846    Lab Results   Component Value Date    WBC 13 25 (H) 11/07/2018    HGB 7 3 (L) 11/07/2018    HCT 24 0 (L) 11/07/2018    MCV 90 11/07/2018     11/07/2018       Physical Exam:     Gen: AAOx3, NAD  CV: RRR  Lungs: CTA b/l  Abd: Soft, non-tender, non-distended, no rebound or guarding  Incision clean, dry, intact; with overlying histacryl  Uterine fundus firm and non-tender, 1 cm below the umbilicus     Ext: Non tender    Samanta Jean-Baptiste MD  11/9/2018  8:46 AM

## 2018-12-04 ENCOUNTER — POSTPARTUM VISIT (OUTPATIENT)
Dept: OBGYN CLINIC | Age: 38
End: 2018-12-04

## 2018-12-04 VITALS
WEIGHT: 152 LBS | DIASTOLIC BLOOD PRESSURE: 60 MMHG | HEIGHT: 61 IN | BODY MASS INDEX: 28.7 KG/M2 | SYSTOLIC BLOOD PRESSURE: 110 MMHG

## 2018-12-04 PROCEDURE — 99024 POSTOP FOLLOW-UP VISIT: CPT | Performed by: NURSE PRACTITIONER

## 2018-12-04 NOTE — PATIENT INSTRUCTIONS
Self Care After Delivery   AMBULATORY CARE:   The postpartum period  is the period of time from delivery to about 6 weeks  During this time you may experience many physical and emotional changes  It is important to understand what is normal and when you need to call your healthcare provider  It is also important to know how to care for yourself during this time  Call 911 for any of the following:   · You soak through 1 pad in 15 minutes, have blurry vision, clammy or pale skin, and feel faint  · You faint or lose consciousness  · Your heart is beating faster than normal      · You have trouble breathing  · You cough up blood  Seek care immediately if:   · You soak through 1 or more pads in an hour, or pass blood clots larger than a quarter from your vagina  · Your leg is painful, red, and larger than normal      · You have severe abdominal pain  · You have a bad headache or changes in your vision  · Your episiotomy or C section incision is red, swollen, bleeding, or draining pus  · Your incision comes apart  · You see or hear things that are not there, or have thoughts of harming yourself or your baby  Contact your obstetrician or midwife if:   · You have a fever  · You have new or worsening pain in your abdomen or vagina  · You continue to have the baby blues 10 days after you deliver  · You have trouble sleeping  · You have foul-smelling discharge from your vagina  · You have pain or burning when you urinate  · You do not have a bowel movement for 3 days or more  · You have nausea or are vomiting  · You have hard lumps or red streaks over your breasts  · You have cracked nipples or bleed from your nipples  · You have questions or concerns about your condition or care  Physical changes:   The following are normal changes after you give birth:  · Pain in the area between your anus and vagina    · Breast pain    · Constipation or hemorrhoids    · Hot or cold flashes    · Vaginal bleeding or discharge    · Mild to moderate abdominal cramping    · Difficulty controlling bowel movements or urine  Emotional changes: The following are symptoms of the baby blues, or normal emotions after you give birth  The changes in your emotions may be caused by a drop in hormone levels after you deliver  If these symptoms last longer than 1 to 2 weeks after you give birth, you may have postpartum depression  · Feeling irritable    · Feeling sad    · Crying for no reason    · Feeling anxious  Breast care for nursing mothers: You may have sore breasts for 3 to 6 days after you give birth  This happens as your milk begins to fill your breasts  You may also have sore breasts if you do not breastfeed frequently  Do the following to care for your breasts:  · Apply a moist, warm, compress to your breast as directed  This may help soothe your breasts  Make sure the washcloth is not too hot before you apply it to your breast      · Nurse your baby or pump your milk frequently  This may prevent clogged milk ducts  Ask your healthcare provider how often to nurse or pump  · Massage your breasts as directed  This may help increase your milk flow  Gently rub your breasts in a circular motion before you breastfeed  You may need to gently squeeze your breast or nipple to help release milk  You can also use a breast pump to help release milk from your breast      · Wash your breasts with warm water only  Do not put soap on your nipples  Soap may cause your nipples to become dry  · Apply lanolin cream to your nipples as directed  Lanolin cream may add moisture to your skin and prevent nipple dryness  Always  wash off lanolin cream with warm water before you breastfeed  · Place pads in your bra  Your nipples may leak milk when you are not breastfeeding  You can place pads inside of your bra to help prevent leaking onto your clothing   Ask your healthcare provider where to purchase bra pads  · Get breastfeeding support if needed  There are healthcare providers who can answer questions about breastfeeding and provide you with support  Ask your healthcare provider who you can contact if you need breastfeeding support  Breast care for non-nursing mothers:  Milk will fill your breasts even if you bottle feed your baby  Do the following to help stop your milk from filling your breasts and causing pain:  · Wear a bra with support at all times  A sports bra or a tight-fitting bra will help stop your milk from coming in  · Apply ice on each breast for 15 to 20 minutes every hour or as directed  Use an ice pack, or put crushed ice in a plastic bag  Cover it with a towel  Ice helps your milk ducts shrink  · Keep your breasts away from warm water  Warm water will make it easier for milk to fill your breasts  Stand with your breasts away from warm water in the shower  · Limit how much you touch your breasts  This will prevent them from filling with milk  Perineum care: Your perineum is the area between your rectum and vagina  It is normal to have swelling and pain in this area after you give birth  If you had an episiotomy, your healthcare provider may give you special instructions  · Clean your perineum after you use the bathroom  This may prevent infection and help with healing  Use a spray bottle with warm water to clean your perineum  You may also gently spray warm water against your perineum when you urinate  Always wipe front to back  · Take a sitz bath as directed  A sitz bath may help relieve swelling and pain  Fill your bath tub or bucket with water up to your hips and sit in the water  Use cold water for 2 days after you deliver  Then use warm water  Ask your healthcare provider for more information about a sitz bath  · Apply ice packs for the first 24 hours or as directed  Use a plastic glove filled with ice or buy an ice pack   Wrap the ice pack or plastic glove in a small towel or wash cloth  Place the ice pack on your perineum for 20 minutes at a time  · Sit on a donut-shaped pillow  This may relieve pressure on your perineum when you sit  · Use wipes with medicine or take pills as directed  Your healthcare provider may tell you to use witch hazel pads  You can place witch hazel pads in the refrigerator before you apply them to your perineum  He may also tell you to take NSAIDs  Ask your healthcare provider how often to take pills or use wipes with medicine  · Do not go swimming or take tub baths for 4 to 6 weeks or as directed  This will help prevent an infection in your vagina or uterus  Bowel and bladder care: It may take 3 to 5 days to have a bowel movement after you deliver your baby  You can do the following to prevent or manage constipation, and get control of your bowel or bladder:  · Take stool softeners as directed  A stool softener is medicine that will make your bowel movements softer  This may prevent or relieve constipation  A stool softener may also make bowel movements less painful  · Drink plenty of liquids  Ask how much liquid to drink each day and which liquids are best for you  Liquids may help prevent constipation  · Eat foods high in fiber  Examples include fruits, vegetables, grains, beans, and lentils  Ask your healthcare provider how much fiber you need each day  Fiber may prevent constipation  · Do Kegel exercises as directed  Kegel exercises will help strengthen the muscles that control bowel movements and urination  Ask your healthcare provider for more information on Kegel exercises  · Apply cold compresses or medicine to hemorrhoids as directed  This may relieve swelling and pain  Your healthcare provider may tell you to apply ice or wipes with medicine to your hemorrhoids  He may also tell you to use a sitz bath   Ask your healthcare for more information on how to manage hemorrhoids  Nutrition:  Good nutrition is important in the postpartum period  It will help you return to a healthy weight, increase your energy levels, and prevent constipation  It will also help you get enough nutrients and calories if you are going to breastfeed your baby  · Eat a variety of healthy foods  Healthy foods include fruits, vegetables, whole-grain breads, low-fat dairy products, beans, lean meats, and fish  You may need 500 to 700 additional calories each day if you breastfeed your baby  You may also need extra protein  · Limit foods with added sugar and high amounts of fat  These foods are high in calories and low in healthy nutrients  Read food labels so you know how much sugar and fat is in the food you want to eat  · Drink 8 to 10 glasses of water per day  Water will help you make plenty of milk for your baby  It will also help prevent constipation  Drink a glass of water every time you breastfeed your baby  · Take vitamins as directed  Ask your healthcare provider what vitamins you need  · Limit caffeine and alcohol if you are breastfeeding  Caffeine and alcohol can get into your breast milk  Caffeine and alcohol can make your baby fussy  They can also interfere with your baby's sleep  Ask your healthcare provider if you can drink alcohol or caffeine  Rest and sleep: You may feel very tired in the postpartum period  Enough sleep will help you heal and give you energy to care for your baby  The following may help you get sleep and rest:  · Nap when your baby naps  Your baby may nap several times during the day  Get rest during this time  · Limit visitors  Many people may want to see you and your baby  Ask friends or family to visit on different days  This will give you time to rest      · Do not plan too much for one day  Put off household chores so that you have time to rest  Gradually do more each day  · Ask for help from family, friends, or neighbors    Ask them to help you with laundry, cleaning, or errands  Also ask someone to watch the baby while you take a nap or relax  Ask your partner to help with the care of your baby  Pump some of your breast milk so your partner can feed your baby during the night  Exercise after delivery:  Wait until your healthcare provider says it is okay to exercise  Exercise can help you lose weight, increase your energy levels, and manage your mood  It can also prevent constipation and blood clots  Start with gentle exercises such as walking  Do more as you have more energy  You may need to avoid abdominal exercises for 1 to 2 weeks after you deliver  Talk to your healthcare provider about an exercise plan that is right for you  Sexual activity after delivery:   · Do not have sex until your healthcare provider says it is okay  You may need to wait 4 to 6 weeks before you have sex  This may prevent infection and allow time to heal      · Your menstrual cycle may begin as soon as 3 weeks after you deliver  Your period may be delayed if you breastfeed your baby  You can become pregnant before you get your first postpartum period  Talk to your healthcare provider about birth control that is right for you  Some types of birth control are not safe during breastfeeding  For support and more information:  Join a support group for new mothers  Ask for help from family and friends with chores, errands, and care of your baby  · Office of Women's Health,  Department of Health and Human Services  [address], [id number] [place] [address]  [address], [id number] [place] [address]  Phone: 5- 140 [phone]  Web Address: www womenshealth gov  · [facility] Postpartum [address] [facility] , [address] [place] [address]  [address]  Web Address: [employer] be  org/pregnancy/postpartum-care  aspx  Follow up with your obstetrician or midwife as directed:   You will need to follow up with your healthcare provider in 2 to 6 weeks after delivery  Write down your questions so you remember to ask them at your visits  © 2017 [address] Information is for End User's use only and may not be sold, redistributed or otherwise used for commercial purposes  All illustrations and images included in CareNotes® are the copyrighted property of A D A M , Inc  or Eyad Amaral  The above information is an  only  It is not intended as medical advice for individual conditions or treatments  Talk to your doctor, nurse or pharmacist before following any medical regimen to see if it is safe and effective for you

## 2018-12-04 NOTE — PROGRESS NOTES
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    Palpitations     halter monitor no problems    Varicella     childhood       Past Surgical History:   Procedure Laterality Date    DILATION AND CURETTAGE OF UTERUS      AK  DELIVERY ONLY Bilateral 2018    Procedure:  SECTION (); Surgeon: Nadine Costa DO;  Location: [facility];  Service: Obstetrics    TOTAL HIP ARTHROPLASTY Right        Current Outpatient Prescriptions:     Prenatal Vit-Fe Fumarate-FA (PRENATAL FA PO), Take by mouth, Disp: , Rfl:     docusate sodium (COLACE) 100 mg capsule, Take 1 capsule (100 mg total) by mouth 2 (two) times a day (Patient not taking: Reported on 2018 ), Disp: 10 capsule, Rfl: 0    ibuprofen (MOTRIN) 600 mg tablet, Take 1 tablet (600 mg total) by mouth every 6 (six) hours as needed for mild pain (Patient not taking: Reported on 2018 ), Disp: 30 tablet, Rfl: 0        Vitals: Blood pressure 110/60, height 5' 1" (1 549 m), weight 68 9 kg (152 lb), last menstrual period 01/15/2018, currently breastfeeding  ,Body mass index is 28 72 kg/m²  Review of Systems:  CONSTITUTIONALno weight loss, fever, night sweats  CVS:negative  RESP:no cough, shortness of breath, or wheezing  GI:Normal BM's, denies hematochezia, melena or pain    :Denies: dysuria, frequency/urgency, hematuria, genital discharge  VAGINAL BLEEDING:staining only        GEN: Beto Comer appears well, alert and oriented x 3, pleasant and cooperative   NECK: supple, no thyromegaly  LUNGS: normal respiratory effort   ABDOMEN: soft, nondistended  EXTREMITIES: no edema  NEURO: no focal findings   BREASTS: bilaterally no masses, no nipple discharge, no skin changes, warm and soft bilaterally  GYN: external genitalia wnl, no lesions, no rashes, no erythema            Uterus: normal size, nontender, mobile            Cervix closed, no discharge            Adnexa: nontender, nonpalpable bilaterally              MEDS:   Current Outpatient Prescriptions:     Prenatal Vit-Fe Fumarate-FA (PRENATAL FA PO), Take by mouth, Disp: , Rfl:     docusate sodium (COLACE) 100 mg capsule, Take 1 capsule (100 mg total) by mouth 2 (two) times a day (Patient not taking: Reported on 12/4/2018 ), Disp: 10 capsule, Rfl: 0    ibuprofen (MOTRIN) 600 mg tablet, Take 1 tablet (600 mg total) by mouth every 6 (six) hours as needed for mild pain (Patient not taking: Reported on 12/4/2018 ), Disp: 30 tablet, Rfl: 0      Assessment: Normal Postpartum Exam  Plan:  Contraceptive method declined  Return for annual 6 months

## 2018-12-27 ENCOUNTER — TELEPHONE (OUTPATIENT)
Dept: OBGYN CLINIC | Facility: CLINIC | Age: 38
End: 2018-12-27

## 2018-12-27 NOTE — TELEPHONE ENCOUNTER
LMOM for pt to cb  Del 11/05, [facility] 12/04  Need to know when she wants to RTW and if any restrictions  Also if we are faxing the letter or if pt is picking it up       Ext: [phone]

## 2018-12-27 NOTE — TELEPHONE ENCOUNTER
Patient needs a back to work note patient was under the impression she needs another appt before going back to work  Please advise patient   KTM delivered  baby

## 2018-12-28 NOTE — TELEPHONE ENCOUNTER
Spoke with pt - she del 11/05, PPAR visit 12/04, RTW 01/17 with no restrictions  Ok to write? Please advise  Thanks!

## 2021-03-23 ENCOUNTER — ANNUAL EXAM (OUTPATIENT)
Dept: OBGYN CLINIC | Age: 41
End: 2021-03-23
Payer: COMMERCIAL

## 2021-03-23 VITALS
SYSTOLIC BLOOD PRESSURE: 112 MMHG | BODY MASS INDEX: 26.06 KG/M2 | HEIGHT: 61 IN | WEIGHT: 138 LBS | DIASTOLIC BLOOD PRESSURE: 84 MMHG

## 2021-03-23 DIAGNOSIS — Z12.31 SCREENING MAMMOGRAM, ENCOUNTER FOR: ICD-10-CM

## 2021-03-23 DIAGNOSIS — R10.2 PELVIC PAIN: Primary | ICD-10-CM

## 2021-03-23 PROCEDURE — 99395 PREV VISIT EST AGE 18-39: CPT | Performed by: NURSE PRACTITIONER

## 2021-03-23 RX ORDER — MULTIVITAMIN
1 TABLET ORAL DAILY
COMMUNITY

## 2021-03-23 NOTE — PATIENT INSTRUCTIONS
Planning for Pregnancy   AMBULATORY CARE:   Why you should plan for pregnancy:  There are things you can do to get your body ready for a healthy pregnancy  A healthy pregnancy can improve your chance of having a healthy baby  The steps you need to take and the amount of time needed depends on your current health and habits  Work with your healthcare provider to help you plan a healthy pregnancy  What you need to know about nutrition and exercise before pregnancy:   · Eat a variety of healthy foods  Healthy foods include fruits, vegetables, whole-grain breads, low-fat dairy foods, beans, lean meats, and fish  Limit foods high in sugar, fat, and sodium  Limit your intake of fish to 2 servings each week  Choose fish low in mercury such as canned light tuna, shrimp, salmon, cod, or tilapia  Do not  eat fish high in mercury such as swordfish, tilefish, thania mackerel, and shark  · Take 400 micrograms (mcg) of folic acid each day  This will help to prevent birth defects of the brain and spine such as spina bifida  Most women should take folic acid before pregnancy and up to 12 weeks after getting pregnant  · Exercise for at least 30 minutes, 5 days a week  Some examples of exercise include walking, biking, dancing, and swimming  Include muscle strengthening activities 2 days each week  Regular exercise provides many health benefits  It helps you manage your weight, and decreases your risk for type 2 diabetes, heart disease, stroke, and high blood pressure  Exercise can also help improve your mood  Ask your healthcare provider about the best exercise plan for you  How weight affects pregnancy:   · Obesity  can make it harder for you to get pregnant  It also increases your risk of health problems during pregnancy  Some of these health problems include gestational diabetes, high blood pressure, and infections  It can also increase your baby's risk of health problems such as birth defects   Your baby may also be large and harder to deliver or be born prematurely (early)  Your risk of miscarriage is also higher if you are obese  Work with your healthcare provider to reach a healthy weight before you try to get pregnant  · Being underweight  can also make it hard for you to get pregnant  It can also increase your risk of having a premature baby and miscarriage  Your baby may be born at a low birth weight  What you need to know about smoking, alcohol, and drugs:   · Smoking  increases your risk of a miscarriage and other health problems during pregnancy  Smoking can cause your baby to be born too early or weigh less at birth  Ask your healthcare provider for information if you need help quitting  · Alcohol  passes from your body to your baby through the placenta  It can affect your baby's brain development and cause fetal alcohol syndrome (FAS)  FAS is a group of conditions that causes mental, behavior, and growth problems  Talk to your healthcare provider if you abuse alcohol and need help quitting before pregnancy  · Drugs , such as marijuana and cocaine, should not be used while you are trying to get pregnant or during pregnancy  They increase your risk of problems during pregnancy and increase the risk of having a baby with health problems  These include birth defects, premature birth, and infant death  What you need to know about medicines and supplements:  Tell your healthcare provider about all the medicines and supplements you take  Certain medicines and supplements should not be used during pregnancy  These include over-the-counter medicines, prescription medicines, vitamins, and herbal supplements  He or she may recommend that you take different medicines that are safer during pregnancy  What you need to know about immunizations:  Tell your healthcare provider about all the immunizations you have had   If you have missed any immunizations, your healthcare provider may recommend that you update your immunizations  These include hepatitis B, influenza, MMR (measles, mumps, rubella), Tdap, and varicella immunizations  Tests you may need to have before pregnancy:  Your healthcare provider may recommend that you have tests to screen for sexually transmitted infections  These include chlamydia, gonorrhea, herpes, HIV infection, syphilis, and tuberculosis  These infectious diseases should be treated before pregnancy, if needed  What you need to know about toxic substances:  Toxic substances can harm a developing baby  Examples include cleaning products, paints, solvents, pesticides, and other chemical products  They can increase the risk of having a miscarriage, premature birth, and low-birth weight baby  They also increase the risk of developmental delay and childhood cancer  Avoid exposure to toxic substances and materials at work and home  What you need to know about genetic testing:  Tell your healthcare provider about your and your partner's family history of genetic disorders, developmental delays, or other disabilities  Also tell your healthcare provider about any problems you have had in previous pregnancies  Your healthcare provider may recommend that you see a healthcare professional called a genetic counselor  He or she will talk to you about how genes, birth defects, and other medical conditions are passed down  He or she can also tell you about your risk for passing a genetic disease in a future pregnancy  How you can prepare for pregnancy if you have a medical condition:  Medical conditions such as diabetes, high blood pressure, asthma, seizure disorders, and thyroid disorders should be managed before pregnancy  Mental health conditions, such as depression and anxiety, should also be treated  This will decrease your risk of having health problems during pregnancy  It will also decrease your baby's risk of medical problems   Medicines used to treat certain conditions are not safe to use during pregnancy and may need to be changed before you get pregnant  Ask your healthcare provider if it is safe for you to get pregnant if you have a medical condition  © Copyright [address] Information is for End User's use only and may not be sold, redistributed or otherwise used for commercial purposes  All illustrations and images included in CareNotes® are the copyrighted property of A D A M , Inc  or [facility] Alyssa Chandler   The above information is an  only  It is not intended as medical advice for individual conditions or treatments  Talk to your doctor, nurse or pharmacist before following any medical regimen to see if it is safe and effective for you

## 2021-03-23 NOTE — PROGRESS NOTES
Diagnoses and all orders for this visit:    Pelvic pain  -     US pelvis complete w transvaginal; Future    Screening mammogram, encounter for  -     Mammo screening bilateral w 3d & cad; Future    Other orders        -     Start a PNV    Calcium/vit d inclusion in the diet discussed, call with any issues, SBE reinforced, all concerns addressed  Pleasant 45 y o  premenopausal female here for annual exam  She denies any issues with bleeding or her menses  Reports regular cycles  + history of HPV  Last Pap  neg and HPV neg, no pap today  Denies vaginal issues  Denies pelvic pain but twice in the past 3 mos has had rlq pain which was short lived and resolved, wonders if it is ovulatory  Declines a BCM- may be trying for another baby and will start PNV  Sexually active without any concerns  Past Medical History:   Diagnosis Date    AMA (advanced maternal age) multigravida 35+     Herpes simplex virus infection 3/20/2018    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    Palpitations     halter monitor no problems    Varicella     childhood     Past Surgical History:   Procedure Laterality Date    DILATION AND CURETTAGE OF UTERUS      IN  DELIVERY ONLY Bilateral 2018    Procedure:  SECTION ();   Surgeon: Prateek Mitchell DO;  Location: [facility];  Service: Obstetrics    TOTAL HIP ARTHROPLASTY Right      Family History   Problem Relation Age of Onset    Hypertension Mother     Hypothyroidism Mother    [facility] Hypothyroidism Father     Asthma Sister     Hypothyroidism Sister     No Known Problems Brother     No Known Problems Daughter     Cancer Maternal Grandfather         prostate    Cancer Paternal Grandmother     No Known Problems Paternal Grandfather     No Known Problems Sister     Cancer Maternal Aunt         leukemia and brst     Social History     Tobacco Use    Smoking status: Never Smoker    Smokeless tobacco: Never Used   Substance Use Topics    Alcohol use: No    Drug use: No       Current Outpatient Medications:     Multiple Vitamin (multivitamin) tablet, Take 1 tablet by mouth daily, Disp: , Rfl:     docusate sodium (COLACE) 100 mg capsule, Take 1 capsule (100 mg total) by mouth 2 (two) times a day (Patient not taking: Reported on 2018 ), Disp: 10 capsule, Rfl: 0    ibuprofen (MOTRIN) 600 mg tablet, Take 1 tablet (600 mg total) by mouth every 6 (six) hours as needed for mild pain (Patient not taking: Reported on 2018 ), Disp: 30 tablet, Rfl: 0    Prenatal Vit-Fe Fumarate-FA (PRENATAL FA PO), Take by mouth, Disp: , Rfl:   Patient Active Problem List    Diagnosis Date Noted     delivery delivered 2018    Sinus tachycardia 2018    Herpes simplex virus infection 2018    HPV in female 2018       No Known Allergies    OB History    Para Term  AB Living   3 2 2 0 1 2   SAB TAB Ectopic Multiple Live Births   1 0 0 0 2      # Outcome Date GA Lbr James/2nd Weight Sex Delivery Anes PTL Lv   3 Term 18 38w6d  3771 g (8 lb 5 oz) M CS-LTranv EPI N RHONA      Complications: Failure to Progress in First Stage   2 SAB      SAB         Complications: History of D&C   1 Term 10/12/02 40w0d  3062 g (6 lb 12 oz) F Vag-Spont EPI N RHONA     24 yo daughter at [employer] and 3 yo son  Stay at home mom    Vitals:    21 0754   BP: 112/84   BP Location: Right arm   Patient Position: Sitting   Cuff Size: Standard   Weight: 62 6 kg (138 lb)   Height: 5' 1" (1 549 m)     Body mass index is 26 07 kg/m²  Review of Systems   Constitutional: Negative for chills, fatigue, fever and unexpected weight change  Respiratory: Negative for shortness of breath  Gastrointestinal: Negative for anal bleeding, blood in stool, constipation and diarrhea  Genitourinary: Negative for difficulty urinating, dysuria and hematuria  Physical Exam   Constitutional: She appears well-developed and well-nourished  No distress  HENT:   Head: Normocephalic  Neck: Normal range of motion  Neck supple  Pulmonary: Effort normal   Breasts: bilateral without masses, skin changes or nipple discharge  Bilaterally soft and warm to touch  No areas of erythema or pain  Abdominal: Soft  Pelvic exam was performed with patient supine  No labial fusion  There is no rash, tenderness, lesion or injury on the right labia  There is no rash, tenderness, lesion or injury on the left labia  Urethral meatus does not show any tenderness, inflammation or discharge  Palpation of midline bladder without pain or discomfort  Uterus is not deviated, not enlarged, not fixed and not tender  Cervix exhibits no motion tenderness, no discharge and no friability  Right adnexum displays no mass, no tenderness and no fullness  Left adnexum displays no mass, no tenderness and no fullness  No erythema or tenderness in the vagina  No foreign body in the vagina  No signs of injury around the vagina  No vaginal discharge found  No signs of injury around the vagina or anus  Perineum without lesions, signs of injury, erythema or swelling  Lymphadenopathy:        Right: No inguinal adenopathy present  Left: No inguinal adenopathy present

## 2021-03-26 ENCOUNTER — HOSPITAL ENCOUNTER (OUTPATIENT)
Dept: ULTRASOUND IMAGING | Facility: CLINIC | Age: 41
Discharge: HOME | End: 2021-03-26
Payer: COMMERCIAL

## 2021-03-26 DIAGNOSIS — R10.2 PELVIC PAIN: ICD-10-CM

## 2021-03-26 PROCEDURE — 76830 TRANSVAGINAL US NON-OB: CPT

## 2021-03-26 PROCEDURE — 76856 US EXAM PELVIC COMPLETE: CPT

## 2021-04-07 ENCOUNTER — HOSPITAL ENCOUNTER (OUTPATIENT)
Dept: MAMMOGRAPHY | Facility: CLINIC | Age: 41
Discharge: HOME | End: 2021-04-07
Payer: COMMERCIAL

## 2021-04-07 VITALS — HEIGHT: 61 IN | BODY MASS INDEX: 26.06 KG/M2 | WEIGHT: 138 LBS

## 2021-04-07 DIAGNOSIS — Z12.31 SCREENING MAMMOGRAM, ENCOUNTER FOR: ICD-10-CM

## 2021-04-07 PROCEDURE — 77063 BREAST TOMOSYNTHESIS BI: CPT

## 2021-04-07 PROCEDURE — 77067 SCR MAMMO BI INCL CAD: CPT

## 2021-04-13 DIAGNOSIS — Z23 ENCOUNTER FOR IMMUNIZATION: ICD-10-CM

## 2021-04-14 ENCOUNTER — HOSPITAL ENCOUNTER (OUTPATIENT)
Dept: MAMMOGRAPHY | Facility: CLINIC | Age: 41
Discharge: HOME | End: 2021-04-14
Payer: COMMERCIAL

## 2021-04-14 ENCOUNTER — HOSPITAL ENCOUNTER (OUTPATIENT)
Dept: ULTRASOUND IMAGING | Facility: CLINIC | Age: 41
Discharge: HOME | End: 2021-04-14
Payer: COMMERCIAL

## 2021-04-14 DIAGNOSIS — R92.8 ABNORMAL SCREENING MAMMOGRAM: ICD-10-CM

## 2021-04-14 PROCEDURE — 76642 ULTRASOUND BREAST LIMITED: CPT

## 2021-04-14 PROCEDURE — G0279 TOMOSYNTHESIS, MAMMO: HCPCS

## 2021-04-14 PROCEDURE — 77065 DX MAMMO INCL CAD UNI: CPT

## 2022-01-19 ENCOUNTER — IMMUNIZATIONS (OUTPATIENT)
Dept: FAMILY MEDICINE CLINIC | Facility: HOSPITAL | Age: 42
End: 2022-01-19

## 2022-01-19 DIAGNOSIS — Z23 ENCOUNTER FOR IMMUNIZATION: Primary | ICD-10-CM

## 2022-01-19 PROCEDURE — 0064A COVID-19 MODERNA VACC 0.25 ML BOOSTER: CPT

## 2022-01-19 PROCEDURE — 91306 COVID-19 MODERNA VACC 0.25 ML BOOSTER: CPT

## 2022-04-15 ENCOUNTER — APPOINTMENT (OUTPATIENT)
Dept: LAB | Facility: HOSPITAL | Age: 42
End: 2022-04-15
Attending: OBSTETRICS & GYNECOLOGY
Payer: COMMERCIAL

## 2022-04-15 ENCOUNTER — OFFICE VISIT (OUTPATIENT)
Dept: OBGYN CLINIC | Facility: CLINIC | Age: 42
End: 2022-04-15
Payer: COMMERCIAL

## 2022-04-15 VITALS — BODY MASS INDEX: 28.42 KG/M2 | WEIGHT: 150.4 LBS | DIASTOLIC BLOOD PRESSURE: 64 MMHG | SYSTOLIC BLOOD PRESSURE: 118 MMHG

## 2022-04-15 DIAGNOSIS — Z11.3 SCREENING EXAMINATION FOR STD (SEXUALLY TRANSMITTED DISEASE): ICD-10-CM

## 2022-04-15 DIAGNOSIS — Z01.419 ENCOUNTER FOR GYNECOLOGICAL EXAMINATION WITHOUT ABNORMAL FINDING: ICD-10-CM

## 2022-04-15 DIAGNOSIS — Z01.419 WOMEN'S ANNUAL ROUTINE GYNECOLOGICAL EXAMINATION: Primary | ICD-10-CM

## 2022-04-15 DIAGNOSIS — Z12.31 SCREENING MAMMOGRAM, ENCOUNTER FOR: ICD-10-CM

## 2022-04-15 PROCEDURE — 99396 PREV VISIT EST AGE 40-64: CPT | Performed by: OBSTETRICS & GYNECOLOGY

## 2022-04-15 PROCEDURE — G0145 SCR C/V CYTO,THINLAYER,RESCR: HCPCS | Performed by: OBSTETRICS & GYNECOLOGY

## 2022-04-15 PROCEDURE — 86695 HERPES SIMPLEX TYPE 1 TEST: CPT

## 2022-04-15 PROCEDURE — G0476 HPV COMBO ASSAY CA SCREEN: HCPCS | Performed by: OBSTETRICS & GYNECOLOGY

## 2022-04-15 PROCEDURE — 86696 HERPES SIMPLEX TYPE 2 TEST: CPT

## 2022-04-15 NOTE — PROGRESS NOTES
Becky Gonzalez is a 37 y o  female who presents for annual well woman exam  Periods are regular every 28-30 days, lasting 6 days  No intermenstrual bleeding, spotting, or discharge  1 , 1 C/S  Current contraception: none  History of abnormal Pap smear: no  mgf colon ca  Maunt breast ca  Menstrual History:  OB History        3    Para   2    Term   2       0    AB   1    Living   2       SAB   1    IAB   0    Ectopic   0    Multiple   0    Live Births   2               Patient's last menstrual period was 2022 (exact date)  Period Cycle (Days): 28  Period Duration (Days): 7  Period Pattern: Regular  Menstrual Flow: Moderate  Dysmenorrhea: (!) Moderate  Dysmenorrhea Symptoms: Cramping    The following portions of the patient's history were reviewed and updated as appropriate: allergies, current medications, past family history, past medical history, past social history, past surgical history and problem list     Review of Systems  Review of Systems   Constitutional: Positive for fatigue  Negative for activity change, appetite change, chills and fever  Respiratory: Negative for cough and shortness of breath  Cardiovascular: Negative for chest pain, palpitations and leg swelling  Gastrointestinal: Negative for abdominal pain, constipation, diarrhea, nausea and vomiting  Genitourinary: Negative for difficulty urinating, dysuria, flank pain, frequency, hematuria, urgency and vaginal discharge  Neurological: Negative for dizziness and headaches  Psychiatric/Behavioral: Negative for confusion            Objective      /64 (BP Location: Left arm, Patient Position: Sitting, Cuff Size: Adult)   Wt 68 2 kg (150 lb 6 4 oz)   LMP 2022 (Exact Date)   BMI 28 42 kg/m²     Physical Exam  OBGyn Exam     General:   alert and oriented, in no acute distress, alert, appears stated age and cooperative   Heart: regular rate and rhythm, S1, S2 normal, no murmur, click, rub or gallop   Lungs: clear to auscultation bilaterally   Abdomen: soft, non-tender, without masses or organomegaly   Vulva: normal   Vagina: normal mucosa, normal discharge   Cervix: no cervical motion tenderness and no lesions   Uterus: normal size   Adnexa:  Breast Exam:  normal adnexa  breasts appear normal, no suspicious masses, no skin or nipple changes or axillary nodes  Assessment      @well woman@   38 yo female  Annual exam   Prior 1 , 1 C/S  Partner genital herpes   Desires to be check for STD  Menstrual migraine  Plan   Pap/HPV  Diet/exercise   Calcium/vitamin-D  Mammogram    PMS symptom discussed with patient   Desire expectant management  To rto for annual exam    All questions answered  There are no Patient Instructions on file for this visit

## 2022-04-16 LAB
HSV1 IGG SER IA-ACNC: 14.4 INDEX (ref 0–0.9)
HSV2 IGG SER IA-ACNC: <0.91 INDEX (ref 0–0.9)

## 2022-04-18 LAB
HPV HR 12 DNA CVX QL NAA+PROBE: NEGATIVE
HPV16 DNA CVX QL NAA+PROBE: NEGATIVE
HPV18 DNA CVX QL NAA+PROBE: NEGATIVE

## 2022-04-21 LAB
LAB AP GYN PRIMARY INTERPRETATION: NORMAL
Lab: NORMAL

## 2022-05-28 ENCOUNTER — HOSPITAL ENCOUNTER (OUTPATIENT)
Dept: MAMMOGRAPHY | Facility: HOSPITAL | Age: 42
Discharge: HOME | End: 2022-05-28
Attending: OBSTETRICS & GYNECOLOGY
Payer: COMMERCIAL

## 2022-05-28 DIAGNOSIS — Z12.31 SCREENING MAMMOGRAM, ENCOUNTER FOR: ICD-10-CM

## 2022-05-28 DIAGNOSIS — Z12.31 ENCOUNTER FOR SCREENING MAMMOGRAM FOR MALIGNANT NEOPLASM OF BREAST: ICD-10-CM

## 2022-05-28 PROCEDURE — 77063 BREAST TOMOSYNTHESIS BI: CPT

## 2022-05-28 PROCEDURE — 77067 SCR MAMMO BI INCL CAD: CPT

## 2022-06-06 DIAGNOSIS — R92.2 BREAST DENSITY: Primary | ICD-10-CM

## 2023-07-10 ENCOUNTER — ANNUAL EXAM (OUTPATIENT)
Dept: OBGYN CLINIC | Facility: CLINIC | Age: 43
End: 2023-07-10
Payer: COMMERCIAL

## 2023-07-10 VITALS
BODY MASS INDEX: 27.87 KG/M2 | SYSTOLIC BLOOD PRESSURE: 118 MMHG | HEIGHT: 61 IN | WEIGHT: 147.6 LBS | DIASTOLIC BLOOD PRESSURE: 80 MMHG

## 2023-07-10 DIAGNOSIS — R92.2 DENSE BREAST: ICD-10-CM

## 2023-07-10 DIAGNOSIS — Z01.419 WOMEN'S ANNUAL ROUTINE GYNECOLOGICAL EXAMINATION: Primary | ICD-10-CM

## 2023-07-10 PROCEDURE — 99396 PREV VISIT EST AGE 40-64: CPT | Performed by: OBSTETRICS & GYNECOLOGY

## 2023-07-10 RX ORDER — VALACYCLOVIR HYDROCHLORIDE 1 G/1
TABLET, FILM COATED ORAL
COMMUNITY
Start: 2023-01-20

## 2023-07-10 NOTE — PROGRESS NOTES
Subjective      Adrian Herndon is a 40 y.o. female who presents for annual well woman exam. Periods are regular every 28-30 days, lasting 7 days. No intermenstrual bleeding, spotting, or discharge. HA and cramping prior and during the period   Current contraception: none  History of abnormal Pap smear: no    mgf colon ca  Maunt breast ca      Menstrual History:  OB History        3    Para   2    Term   2       0    AB   1    Living   2       SAB   1    IAB   0    Ectopic   0    Multiple   0    Live Births   2                  Patient's last menstrual period was 2023 (exact date). Period Cycle (Days): 28  Period Duration (Days): 7  Period Pattern: Regular  Menstrual Flow: Moderate  Dysmenorrhea: (!) Moderate (In between mild and moderate.)  Dysmenorrhea Symptoms: Cramping, Headache    The following portions of the patient's history were reviewed and updated as appropriate: allergies, current medications, past family history, past medical history, past social history, past surgical history and problem list.    Review of Systems  Review of Systems   Constitutional: Negative for activity change, appetite change, chills, fatigue and fever. Respiratory: Negative for apnea, cough, chest tightness and shortness of breath. Cardiovascular: Negative for chest pain, palpitations and leg swelling. Gastrointestinal: Negative for abdominal pain, constipation, diarrhea, nausea and vomiting. Genitourinary: Negative for difficulty urinating, dysuria, flank pain, frequency, hematuria and urgency. Neurological: Negative for dizziness, seizures, syncope, light-headedness, numbness and headaches. Psychiatric/Behavioral: Negative for agitation and confusion.           Objective      /80 (BP Location: Left arm, Patient Position: Sitting, Cuff Size: Adult)   Ht 5' 1" (1.549 m)   Wt 67 kg (147 lb 9.6 oz)   LMP 2023 (Exact Date)   BMI 27.89 kg/m²     Physical Exam  OBGyn Exam     General: alert and oriented, in no acute distress, alert, appears stated age and cooperative   Heart: regular rate and rhythm, S1, S2 normal, no murmur, click, rub or gallop   Lungs: clear to auscultation bilaterally   Abdomen: soft, non-tender, without masses or organomegaly   Vulva: normal   Vagina: normal mucosa, normal discharge   Cervix: no cervical motion tenderness and no lesions   Uterus: normal size   Adnexa:  Breast Exam:  normal adnexa  breasts appear normal, no suspicious masses, no skin or nipple changes or axillary nodes. Assessment      @well woman@ . 39 yo female  Annual exam   Prior 1 , 1 C/S  Partner genital herpes   Menstrual migraine  Desires to conceive risk of AMA in pregnancy reviewed and discussed with patient  Plan   Pap/HPV neg   Diet/exercise   Calcium/vitamin-D  Mammogram  breast density recommend ABUS ultrasound  PMS symptom discussed with patient Desire expectant management  To rto for annual exam        All questions answered. There are no Patient Instructions on file for this visit.

## 2023-10-09 ENCOUNTER — HOSPITAL ENCOUNTER (OUTPATIENT)
Dept: ULTRASOUND IMAGING | Facility: CLINIC | Age: 43
Discharge: HOME | End: 2023-10-09
Payer: COMMERCIAL

## 2023-10-09 VITALS — HEIGHT: 61 IN | BODY MASS INDEX: 27.75 KG/M2 | WEIGHT: 147 LBS

## 2023-10-09 DIAGNOSIS — R92.30 DENSE BREAST: ICD-10-CM

## 2023-10-09 DIAGNOSIS — R92.2 DENSE BREAST: ICD-10-CM

## 2023-10-09 PROCEDURE — 76641 ULTRASOUND BREAST COMPLETE: CPT

## 2023-10-16 ENCOUNTER — HOSPITAL ENCOUNTER (OUTPATIENT)
Dept: ULTRASOUND IMAGING | Facility: CLINIC | Age: 43
Discharge: HOME | End: 2023-10-16
Payer: COMMERCIAL

## 2023-10-16 DIAGNOSIS — R92.8 ABNORMAL MAMMOGRAM: ICD-10-CM

## 2023-10-16 PROCEDURE — 76642 ULTRASOUND BREAST LIMITED: CPT

## 2023-10-19 ENCOUNTER — TELEPHONE (OUTPATIENT)
Dept: HEMATOLOGY ONCOLOGY | Facility: CLINIC | Age: 43
End: 2023-10-19

## 2023-10-19 DIAGNOSIS — R92.8 ABNORMAL MAMMOGRAM: Primary | ICD-10-CM

## 2023-10-19 NOTE — TELEPHONE ENCOUNTER
Patient called in stating she got a call from her doctors office and they advised to call and schedile an appt with Dr. Garima Haile. She was advised that Dr. Garima Haile informed the doctor to schedule with her. I looked in all the notes and didn't find a referral no info on Dr. Garima Haile advising to have the patient scheduled. I asked for the dx and she only said it had to do with her ultrasound. I provided her the fax number and to get more info and something that Dr. Garima Haile agreed to have her scheduled with her.

## 2023-10-20 ENCOUNTER — TELEPHONE (OUTPATIENT)
Dept: HEMATOLOGY ONCOLOGY | Facility: CLINIC | Age: 43
End: 2023-10-20

## 2023-10-20 ENCOUNTER — OFFICE VISIT (OUTPATIENT)
Dept: OBGYN CLINIC | Facility: CLINIC | Age: 43
End: 2023-10-20

## 2023-10-20 VITALS
DIASTOLIC BLOOD PRESSURE: 82 MMHG | HEIGHT: 61 IN | SYSTOLIC BLOOD PRESSURE: 120 MMHG | WEIGHT: 147.2 LBS | BODY MASS INDEX: 27.79 KG/M2

## 2023-10-20 DIAGNOSIS — N89.8 VAGINAL ODOR: ICD-10-CM

## 2023-10-20 DIAGNOSIS — N76.0 BV (BACTERIAL VAGINOSIS): Primary | ICD-10-CM

## 2023-10-20 DIAGNOSIS — N89.8 VAGINAL DISCHARGE: ICD-10-CM

## 2023-10-20 DIAGNOSIS — B96.89 BV (BACTERIAL VAGINOSIS): Primary | ICD-10-CM

## 2023-10-20 LAB
BV WHIFF TEST VAG QL: POSITIVE
CLUE CELLS SPEC QL WET PREP: PRESENT
PH SMN: 5 [PH]
SL AMB POCT WET MOUNT: ABNORMAL
T VAGINALIS VAG QL WET PREP: ABNORMAL
YEAST VAG QL WET PREP: ABNORMAL

## 2023-10-20 PROCEDURE — 87480 CANDIDA DNA DIR PROBE: CPT | Performed by: PHYSICIAN ASSISTANT

## 2023-10-20 PROCEDURE — 87510 GARDNER VAG DNA DIR PROBE: CPT | Performed by: PHYSICIAN ASSISTANT

## 2023-10-20 PROCEDURE — 87660 TRICHOMONAS VAGIN DIR PROBE: CPT | Performed by: PHYSICIAN ASSISTANT

## 2023-10-20 RX ORDER — METRONIDAZOLE 500 MG/1
500 TABLET ORAL EVERY 12 HOURS SCHEDULED
Qty: 14 TABLET | Refills: 0 | Status: SHIPPED | OUTPATIENT
Start: 2023-10-20 | End: 2023-10-27

## 2023-10-20 NOTE — PROGRESS NOTES
Assessment/Plan:       Diagnoses and all orders for this visit:    BV (bacterial vaginosis)  -     metroNIDAZOLE (FLAGYL) 500 mg tablet; Take 1 tablet (500 mg total) by mouth every 12 (twelve) hours for 7 days No alcohol with medication. Vaginal odor  -     metroNIDAZOLE (FLAGYL) 500 mg tablet; Take 1 tablet (500 mg total) by mouth every 12 (twelve) hours for 7 days No alcohol with medication. -     VAGINOSIS DNA PROBE (AFFIRM)  -     POCT wet mount    Vaginal discharge  -     VAGINOSIS DNA PROBE (AFFIRM)  -     POCT wet mount     52-year-old female presenting today for same-day appointment for abnormal vaginal discharge as in HPI. Exam with small amount of thicker white-yellow discharge, mild odor. Point-of-care wet mount performed revealing clue cells, pH 5.0, whiff test positive. No trichomonads or budding yeast seen. Plan:  Suspect BV, will start patient on oral metronidazole therapy  Affirm collected  We will call with results and follow-up with patient at that time    Patient up-to-date with annual and Pap smear. Chief Complaint   Patient presents with    Vaginal Discharge     Thick, yellowish discharge, slight odor,no itching        Subjective:      Patient ID: Susan Huffman is a 44 y.o. female. 44y/o F here today for acute visit for abnormal vaginal discharge x 1 day. She notes slight odor. Denies vaginal itching or pain. Denies abnormal bleeding from vagina. No pelvic pain. Denies urinary sxs. No previous hx of B or vaginal infection. Pt and her partner are actively trying to conceive. The following portions of the patient's history were reviewed and updated as appropriate: allergies, current medications, past family history, past medical history, past social history, past surgical history, and problem list.    Review of Systems   Constitutional: Negative. Gastrointestinal: Negative.     Genitourinary:         As in HPI         Objective:      /82 (BP Location: Left arm, Patient Position: Sitting, Cuff Size: Large)   Ht 5' 1" (1.549 m)   Wt 66.8 kg (147 lb 3.2 oz)   LMP 10/04/2023 (Exact Date)   Breastfeeding No   BMI 27.81 kg/m²          Physical Exam  Vitals reviewed. Constitutional:       Appearance: Normal appearance. Genitourinary:     General: Normal vulva. Labia:         Right: No rash, tenderness or lesion. Left: No rash, tenderness or lesion. Vagina: Vaginal discharge (slight increase in thicker white-yellow discharge, slight odor) present. No erythema, tenderness, bleeding or lesions. Cervix: No cervical motion tenderness, discharge, friability, lesion, erythema or cervical bleeding. Neurological:      Mental Status: She is alert and oriented to person, place, and time. Psychiatric:         Mood and Affect: Mood normal.         Behavior: Behavior normal. Behavior is cooperative.

## 2023-10-20 NOTE — TELEPHONE ENCOUNTER
Appointment Schedule   Who are you speaking with? Patient   If it is not the patient, are they listed on an active communication consent form? N/A   Which provider is the appointment scheduled with? LINETTE Curry   At which location is the appointment scheduled for? [facility]   When is the appointment scheduled? Please list date and time 10/25/23 @ 2   What is the reason for this appointment? Abnormal mammogram    Did patient voice understanding of the details of this appointment? Yes   Was the no show policy reviewed with patient?  Yes

## 2023-10-21 LAB
CANDIDA RRNA VAG QL PROBE: POSITIVE
G VAGINALIS RRNA GENITAL QL PROBE: POSITIVE
T VAGINALIS RRNA GENITAL QL PROBE: NEGATIVE

## 2023-10-23 DIAGNOSIS — B37.31 VAGINAL YEAST INFECTION: Primary | ICD-10-CM

## 2023-10-23 RX ORDER — FLUCONAZOLE 150 MG/1
150 TABLET ORAL
Qty: 2 TABLET | Refills: 0 | Status: SHIPPED | OUTPATIENT
Start: 2023-10-23 | End: 2023-10-27

## 2023-10-25 ENCOUNTER — CONSULT (OUTPATIENT)
Dept: SURGICAL ONCOLOGY | Facility: CLINIC | Age: 43
End: 2023-10-25
Payer: COMMERCIAL

## 2023-10-25 VITALS
DIASTOLIC BLOOD PRESSURE: 76 MMHG | SYSTOLIC BLOOD PRESSURE: 138 MMHG | OXYGEN SATURATION: 100 % | HEART RATE: 86 BPM | BODY MASS INDEX: 28.13 KG/M2 | WEIGHT: 149 LBS | TEMPERATURE: 97.9 F | RESPIRATION RATE: 16 BRPM | HEIGHT: 61 IN

## 2023-10-25 DIAGNOSIS — R92.30 DENSE BREAST: ICD-10-CM

## 2023-10-25 DIAGNOSIS — R92.2 DENSE BREAST: ICD-10-CM

## 2023-10-25 DIAGNOSIS — N60.01 CYST OF RIGHT BREAST: ICD-10-CM

## 2023-10-25 DIAGNOSIS — D24.1 BREAST FIBROADENOMA IN FEMALE, RIGHT: Primary | ICD-10-CM

## 2023-10-25 PROCEDURE — 99204 OFFICE O/P NEW MOD 45 MIN: CPT

## 2023-10-25 NOTE — PROGRESS NOTES
Surgical Oncology Consult       [address] [facility]  [facility] [place]  [address]  [place] [address]    [facility]  [birth year]  [id number]  [address] [facility]  [facility] [place]  [address]  [place] [address]    Diagnoses and all orders for this visit:    Breast fibroadenoma in female, right    Cyst of right breast    Dense breast        Chief Complaint   Patient presents with    Consult     Patient being seen for consult for abnormal ABUS. Return in about 6 months (around 4/25/2024) for Office Visit. History of Present Illness: The patient presents to the office today in consultation for a recent abnormal ABUS. This was performed earlier this month for dense breast tissue, and revealed two areas of concern in the upper portion of the right breast.  Subsequent diagnostic US identified an 8mm focus at the 12:00 position which appears to be a fibroadenoma, and a 7mm cystic focus at the 11:00 position which appears benign as well. She denies any new lumps, skin changes, tenderness or swelling. She denies any prior breast biopsies or surgeries, and has no history of breast problems. She experienced menarche at age 16, and had her first child at age 25. She reports a maternal aunt with breast cancer and leukemia "older than me" but is unsure of the ages of diagnosis, and a grandfather with prostate cancer as well as colon cancer around age 61. I have calculated her TC risk at 11% and Brittney Gang model risk at 9.7%. Review of Systems   Constitutional:  Negative for activity change, appetite change, chills, fatigue, fever and unexpected weight change. HENT: Negative. Respiratory: Negative. Negative for cough and shortness of breath. Cardiovascular: Negative. Gastrointestinal: Negative. Negative for abdominal distention, abdominal pain, nausea and vomiting.    Musculoskeletal: Negative. Skin: Negative. Negative for color change, rash and wound. Neurological: Negative. Negative for dizziness and headaches. Hematological: Negative. Negative for adenopathy. Psychiatric/Behavioral: Negative. Patient Active Problem List   Diagnosis    Herpes simplex virus infection    HPV in female    Sinus tachycardia     delivery delivered    Cyst of right breast    Breast fibroadenoma in female, right    Dense breast     Past Medical History:   Diagnosis Date    AMA (advanced maternal age) multigravida 35+     Herpes simplex virus infection 2018    HPV in female 2018    Migraine     Miscarriage     X1    Palpitations     halter monitor no problems    Varicella     childhood     Past Surgical History:   Procedure Laterality Date     SECTION      DILATION AND CURETTAGE OF UTERUS      [place]  DELIVERY ONLY Bilateral 2018    Procedure:  SECTION ();   Surgeon: Benito Figueredo DO;  Location: [facility];  Service: Obstetrics    TOTAL HIP ARTHROPLASTY Right 2012    WISDOM TOOTH EXTRACTION       Family History   Problem Relation Age of Onset    Hypertension Mother     Hypothyroidism Mother     Hypothyroidism Father     Asthma Sister     Hypothyroidism Sister     No Known Problems Sister     No Known Problems Brother     Colon cancer Maternal Grandfather 61    Prostate cancer Maternal Grandfather     Cancer Paternal Grandmother     No Known Problems Paternal Grandfather     No Known Problems Daughter     Breast cancer Maternal Aunt 61    Cancer Maternal Aunt         leukemia and brst     Social History     Socioeconomic History    Marital status: /Civil Union     Spouse name: Varsha Arvizu    Number of children: Not on file    Years of education: Not on file    Highest education level: Not on file   Occupational History    Occupation:    Tobacco Use    Smoking status: Never     Passive exposure: Never    Smokeless tobacco: Never   Vaping Use    Vaping Use: Never used   Substance and Sexual Activity    Alcohol use: Yes    Drug use: No    Sexual activity: Yes     Partners: Male     Birth control/protection: None   Other Topics Concern    Not on file   Social History Narrative    Not on file     Social Determinants of Health     Financial Resource Strain: Not on file   Food Insecurity: Not on file   Transportation Needs: Not on file   Physical Activity: Not on file   Stress: Not on file   Social Connections: Not on file   Intimate Partner Violence: Not on file   Housing Stability: Not on file       Current Outpatient Medications:     fluconazole (DIFLUCAN) 150 mg tablet, Take 1 tablet (150 mg total) by mouth every 3 (three) days for 2 doses, Disp: 2 tablet, Rfl: 0    metroNIDAZOLE (FLAGYL) 500 mg tablet, Take 1 tablet (500 mg total) by mouth every 12 (twelve) hours for 7 days No alcohol with medication. , Disp: 14 tablet, Rfl: 0    Multiple Vitamin (multivitamin) tablet, Take 1 tablet by mouth daily, Disp: , Rfl:     valACYclovir (VALTREX) 1,000 mg tablet, PRN (Patient not taking: Reported on 10/20/2023), Disp: , Rfl:   No Known Allergies  Vitals:    10/25/23 1420   BP: 138/76   Pulse: 86   Resp: 16   Temp: 97.9 °F (36.6 °C)   SpO2: 100%       Physical Exam  Constitutional:       General: She is not in acute distress. Appearance: Normal appearance. She is normal weight. She is not ill-appearing or toxic-appearing. HENT:      Head: Normocephalic and atraumatic. Nose: Nose normal.      Mouth/Throat:      Mouth: Mucous membranes are moist.   Eyes:      General: No scleral icterus. Conjunctiva/sclera: Conjunctivae normal.   Cardiovascular:      Rate and Rhythm: Normal rate. Pulmonary:      Effort: Pulmonary effort is normal.   Chest:   Breasts:     Right: No inverted nipple, nipple discharge, skin change or tenderness. Left: No inverted nipple, nipple discharge, skin change or tenderness.           Comments: Breasts have fibroglandular densities present bilaterally. Small masses palpable in the right breast which correlate to US findings. There are no skin changes or tenderness present bilaterally. I do not appreciate any adenopathy. Musculoskeletal:         General: Normal range of motion. Cervical back: Normal range of motion and neck supple. Lymphadenopathy:      Cervical: No cervical adenopathy. Upper Body:      Right upper body: No supraclavicular, axillary or pectoral adenopathy. Left upper body: No supraclavicular, axillary or pectoral adenopathy. Skin:     General: Skin is warm and dry. Neurological:      General: No focal deficit present. Mental Status: She is alert and oriented to person, place, and time. Psychiatric:         Mood and Affect: Mood normal.         Behavior: Behavior normal.         Thought Content: Thought content normal.         Judgment: Judgment normal.            Imaging  US breast right limited (diagnostic)    Result Date: 10/16/2023  Narrative: DIAGNOSIS: Abnormal mammogram TECHNIQUE: Ultrasound of the right breast(s) was performed. COMPARISONS: Prior breast imaging dated: 10/09/2023, 05/30/2023, 05/30/2023, 05/28/2022, 04/14/2021, 04/14/2021, and 04/07/2021 RELEVANT HISTORY: Family Breast Cancer History: History of breast cancer in Maternal Aunt. Family Medical History: Family medical history includes breast cancer in maternal aunt and colon cancer in maternal grandfather. Personal History: No known relevant hormone history. No known relevant surgical history. No known relevant medical history. RISK ASSESSMENT: 5 Year Tyrer-Cuzick: 1.17 % 10 Year Tyrer-Cuzick: 2.85 % Lifetime Tyrer-Cuzick: 18.52 % INDICATION: Sandee Kimble is a 44 y.o. female presenting for abnormal mammogram. FINDINGS: RIGHT 1) MASS [B]: There is an 8 mm x 4 mm x 8 mm oval, hypoechoic mass seen in the right breast at 12 o'clock, 3.5 cm from the nipple.  2) MASS [C]: There is a 7 mm x 7 mm x 5 mm oval, hypoechoic mass with circumscribed margins seen in the right breast at 11 o'clock, 7 cm from the nipple. This can represent a complex cyst or cluster of cysts and corresponds to the finding on ABUS. Impression:  2 probably benign masses in the right breast at 11 and 12:00 corresponding to the findings on ABUS. The one at 12:00 is probably a fibroadenoma with the one at 11:00 most likely complex cyst or cluster of cysts. Sonographic follow-up is recommended in 6 months. ASSESSMENT/BI-RADS CATEGORY: Right: 3 - Probably Benign Overall: 3 - Probably Benign RECOMMENDATION:      - Ultrasound in 6 months for the right breast. Workstation ID: [id number]    US breast screening bilateral complete (ABUS)    Result Date: 10/16/2023  Narrative: DIAGNOSIS: Dense breast TECHNIQUE: Automated breast ultrasound images were obtained on the [facility] system. Imaging was obtained in standard projections including AP, lateral and medial for both breasts, inclusive of all four quadrants and the retroareolar region. COMPARISONS: Prior breast imaging dated: 05/30/2023, 05/28/2022, 04/14/2021, 04/14/2021, and 04/07/2021 RELEVANT HISTORY: Family Breast Cancer History: History of breast cancer in Maternal Aunt. Family Medical History: Family medical history includes breast cancer in maternal aunt and colon cancer in maternal grandfather. Personal History: No known relevant hormone history. No known relevant surgical history. No known relevant medical history. RISK ASSESSMENT: 5 Year Tyrer-Cuzick: 1.17 % 10 Year Tyrer-Cuzick: 2.85 % Lifetime Tyrer-Cuzick: 18.52 % TISSUE COMPOSITION: Homogeneous background echotexture - fibroglandular INDICATION: Saurabh Arambula is a 44 y.o. female with dense breasts presenting for automated breast ultrasound screening. FINDINGS: Bilateral Images of the right breast at 12:00, 3.5 cm from the nipple, 6 mm from the skin show a 6 mm hypoechoic mass with internal echoes.  Images of the right breast at 11:00, 7 cm from the nipple show a 7 mm hypoechoic oval-shaped mass. Diagnostic ultrasound is recommended. There are no suspicious masses or areas of architectural distortion in the left breast.     Impression:  Small masses in the right breast at 11 and 12:00. Diagnostic ultrasound is recommended. Automated breast ultrasound is an adjunct, not a replacement, for routine yearly screening mammography. ASSESSMENT/BI-RADS CATEGORY: Left: 1 - Negative Right: 0 - Incomplete: Needs Additional Imaging Evaluation Overall: 0 - Incomplete: Needs Additional Imaging Evaluation RECOMMENDATION:      - Ultrasound at the current time for the right breast.      - Continue annual screening mammography for the left breast.      - ABUS in 1 year for the left breast. Workstation ID: [id number]    I personally reviewed and interpreted the above imaging data. Discussion/Summary: This is a 45 y/o female with dense breasts who presents today for consultation following abnormal breast screening. Her exam is reassuring, and is consistent with benign findings discussed in the US report. I have explained that surgery is not indicated. I will plan to see her again in 6 months following repeat imaging. Assuming that is stable, I have explained that she can return to regular screening with her ObGyn. She is not at a statistically elevated risk for breast cancer. Should the fibroadenoma increase in size, or if she develops pain in this area, I can arrange for her meet one of our surgeons for a discussion. I have encouraged her to continue with annual ABUS going forward. She is agreeable to the plan, all questions have been answered today.

## 2024-04-22 ENCOUNTER — TELEPHONE (OUTPATIENT)
Dept: SURGICAL ONCOLOGY | Facility: CLINIC | Age: 44
End: 2024-04-22

## 2024-04-22 NOTE — TELEPHONE ENCOUNTER
Called patient and left voice message to reschedule follow up appointment after Mammo and US 4/30/24 are complete. Patient's 4/25/24 appointment is cancelled. Gave patient the hopeline to return call and have follow up with LINETTE Carreon rescheduled.

## 2024-04-29 ENCOUNTER — TELEPHONE (OUTPATIENT)
Dept: HEMATOLOGY ONCOLOGY | Facility: CLINIC | Age: 44
End: 2024-04-29

## 2024-04-29 NOTE — TELEPHONE ENCOUNTER
Appointment Change  Cancel, Reschedule, Change to Virtual      Who are you speaking with? Patient   If it is not the patient, is the caller listed on the communication consent form? Yes   Which provider is the appointment scheduled with? LINETTE Casas   When was the original appointment scheduled?    Please list date and time 4/25   At which location is the appointment scheduled to take place? [facility]   Was the appointment rescheduled?     Was the appointment changed from an in person visit to a virtual visit?    If so, please list the details of the change. 5/1 8am   What is the reason for the appointment change? Schedule conflict       Was STAR transport scheduled? No   Does STAR transport need to be scheduled for the new visit (if applicable) No   Does the patient need an infusion appointment rescheduled? No   Does the patient have an upcoming infusion appointment scheduled? If so, when? No   Is the patient undergoing chemotherapy? No   For appointments cancelled with less than 24 hours:  Was the no-show policy reviewed? Yes

## 2024-04-30 ENCOUNTER — HOSPITAL ENCOUNTER (OUTPATIENT)
Dept: MAMMOGRAPHY | Facility: CLINIC | Age: 44
Discharge: HOME | End: 2024-04-30
Payer: COMMERCIAL

## 2024-04-30 ENCOUNTER — HOSPITAL ENCOUNTER (OUTPATIENT)
Dept: ULTRASOUND IMAGING | Facility: CLINIC | Age: 44
Discharge: HOME | End: 2024-04-30
Payer: COMMERCIAL

## 2024-04-30 VITALS — HEIGHT: 61 IN | BODY MASS INDEX: 26.43 KG/M2 | WEIGHT: 140 LBS

## 2024-04-30 DIAGNOSIS — R92.8 FOLLOW-UP EXAMINATION OF ABNORMAL MAMMOGRAM: ICD-10-CM

## 2024-04-30 PROCEDURE — G0279 TOMOSYNTHESIS, MAMMO: HCPCS

## 2024-04-30 PROCEDURE — 76642 ULTRASOUND BREAST LIMITED: CPT

## 2024-04-30 PROCEDURE — 77066 DX MAMMO INCL CAD BI: CPT

## 2024-05-02 DIAGNOSIS — R92.8 ABNORMAL MAMMOGRAM: Primary | ICD-10-CM

## 2024-05-09 ENCOUNTER — OFFICE VISIT (OUTPATIENT)
Dept: SURGICAL ONCOLOGY | Facility: CLINIC | Age: 44
End: 2024-05-09
Payer: COMMERCIAL

## 2024-05-09 VITALS
HEIGHT: 61 IN | SYSTOLIC BLOOD PRESSURE: 110 MMHG | HEART RATE: 75 BPM | OXYGEN SATURATION: 99 % | WEIGHT: 147 LBS | TEMPERATURE: 97.7 F | BODY MASS INDEX: 27.75 KG/M2 | DIASTOLIC BLOOD PRESSURE: 68 MMHG

## 2024-05-09 DIAGNOSIS — D24.1 BREAST FIBROADENOMA IN FEMALE, RIGHT: Primary | ICD-10-CM

## 2024-05-09 PROCEDURE — 99213 OFFICE O/P EST LOW 20 MIN: CPT

## 2024-05-09 NOTE — PROGRESS NOTES
Surgical Oncology Follow Up       [facility] [place]  [address]  [place] [address]    Toyin Lee  1980  [id number]  [facility] [place]  [address]  [place] [address]    Diagnoses and all orders for this visit:    Breast fibroadenoma in female, right        Chief Complaint   Patient presents with    Follow-up       Return in about 6 months (around 2024) for Office Visit.      History of Present Illness: the patient returns to the office today in follow-up for right breast fibroadenomas.  She denies any changes since her last visit.  She does not feel there has been any change in size, and she denies any pain.  Mammogram and US were performed on , BIRADS-3 (R), category 4 density.  I have reviewed these results myself and discussed them with the patient today.      Review of Systems   Constitutional:  Negative for activity change, appetite change, chills and fever.   HENT: Negative.     Respiratory: Negative.     Cardiovascular: Negative.    Gastrointestinal: Negative.    Musculoskeletal: Negative.    Skin: Negative.  Negative for color change and wound.   Neurological: Negative.    Hematological: Negative.  Negative for adenopathy.   Psychiatric/Behavioral: Negative.               Patient Active Problem List   Diagnosis    Herpes simplex virus infection    HPV in female    Sinus tachycardia     delivery delivered    Cyst of right breast    Breast fibroadenoma in female, right    Dense breast     Past Medical History:   Diagnosis Date    AMA (advanced maternal age) multigravida 35+     Herpes simplex virus infection 2018    HPV in female 2018    Migraine     Miscarriage     X1    Palpitations     halter monitor no problems    Varicella     childhood     Past Surgical History:   Procedure Laterality Date     SECTION       DILATION AND CURETTAGE OF UTERUS      [place]  DELIVERY ONLY Bilateral 2018    Procedure:  SECTION ();  Surgeon: Carmina Zamora DO;  Location: [facility] ;  Service: Obstetrics    TOTAL HIP ARTHROPLASTY Right 2012    WISDOM TOOTH EXTRACTION       Family History   Problem Relation Age of Onset    Hypertension Mother     Hypothyroidism Mother     Hypothyroidism Father     Asthma Sister     Hypothyroidism Sister     No Known Problems Sister     No Known Problems Daughter     Colon cancer Maternal Grandfather 60    Prostate cancer Maternal Grandfather     Cancer Paternal Grandmother     No Known Problems Paternal Grandfather     No Known Problems Brother     Breast cancer Maternal Aunt 60    Cancer Maternal Aunt         leukemia and brst     Social History     Socioeconomic History    Marital status: /Civil Union     Spouse name: Clarence    Number of children: Not on file    Years of education: Not on file    Highest education level: Not on file   Occupational History    Occupation:    Tobacco Use    Smoking status: Never     Passive exposure: Never    Smokeless tobacco: Never   Vaping Use    Vaping status: Never Used   Substance and Sexual Activity    Alcohol use: Yes    Drug use: No    Sexual activity: Yes     Partners: Male     Birth control/protection: None   Other Topics Concern    Not on file   Social History Narrative    Not on file     Social Determinants of Health     Financial Resource Strain: Not on file   Food Insecurity: Not on file   Transportation Needs: Not on file   Physical Activity: Not on file   Stress: Not on file   Social Connections: Not on file   Intimate Partner Violence: Not on file   Housing Stability: Not on file       Current Outpatient Medications:     Multiple Vitamin (multivitamin) tablet, Take 1 tablet by mouth daily, Disp: , Rfl:     valACYclovir (VALTREX) 1,000 mg tablet, PRN (Patient not taking: Reported on 10/20/2023), Disp: , Rfl:   No  Known Allergies  Vitals:    05/09/24 1131   BP: 110/68   Pulse: 75   Temp: 97.7 °F (36.5 °C)   SpO2: 99%       Physical Exam  Vitals reviewed.   Constitutional:       General: She is not in acute distress.     Appearance: Normal appearance. She is normal weight. She is not ill-appearing or toxic-appearing.   HENT:      Head: Normocephalic and atraumatic.      Nose: Nose normal.      Mouth/Throat:      Mouth: Mucous membranes are moist.   Eyes:      General: No scleral icterus.  Cardiovascular:      Rate and Rhythm: Normal rate.   Pulmonary:      Effort: Pulmonary effort is normal.   Chest:          Comments: Breasts are symmetric bilaterally with palpable dense tissue present throughout. Palpable masses at the 11:00 and 12:00 positions consistent with I imaging. There are no skin changes or tenderness on exam. I do not appreciate any adenopathy.    Musculoskeletal:         General: No swelling or tenderness. Normal range of motion.      Cervical back: Normal range of motion and neck supple.   Lymphadenopathy:      Cervical: No cervical adenopathy.      Upper Body:      Right upper body: No supraclavicular, axillary or pectoral adenopathy.      Left upper body: No supraclavicular, axillary or pectoral adenopathy.   Skin:     General: Skin is warm and dry.      Findings: No erythema.   Neurological:      General: No focal deficit present.      Mental Status: She is alert and oriented to person, place, and time.   Psychiatric:         Mood and Affect: Mood normal.         Behavior: Behavior normal.         Thought Content: Thought content normal.         Judgment: Judgment normal.                 Imaging  Mammo diagnostic bilateral w 3d & cad, US breast right limited (diagnostic)    Result Date: 4/30/2024  Narrative: DIAGNOSIS: Follow-up examination of abnormal mammogram TECHNIQUE: Digital diagnostic mammography was performed. Computer Aided Detection (CAD) analyzed all applicable images. Right breast ultrasound was  performed. COMPARISONS: Prior breast imaging dated: 10/16/2023, 10/09/2023, 05/30/2023, 05/30/2023, 05/28/2022, 04/14/2021, 04/14/2021, and 04/07/2021 RELEVANT HISTORY: Family Breast Cancer History: History of breast cancer in Maternal Aunt. Family Medical History: Family medical history includes breast cancer in maternal aunt and colon cancer in maternal grandfather. Personal History: No known relevant hormone history. No known relevant surgical history. No known relevant medical history. RISK ASSESSMENT: 5 Year Tyrer-Cuzick: 1.05% 10 Year Tyrer-Cuzick: 2.56% Lifetime Tyrer-Cuzick: 16.78% TISSUE DENSITY: The breasts are extremely dense, which lowers the sensitivity of mammography. INDICATION: Toyin Lee is a 43 y.o. female presenting for Yearly mammogram. FINDINGS: RIGHT 1) MASS [B] Mammo diagnostic bilateral w 3d & cad: There are no corresponding masses seen on this modality. US breast right limited (diagnostic): There is an 8 mm x 4 mm x 9 mm oval, parallel, hypoechoic mass with circumscribed margins with no posterior features seen in the right breast at 12 o'clock, 3 cm from the nipple. The mass correlates with the prior ultrasound finding. Compared to the previous study, there are no significant changes, fibroadenoma suspected.  An adjacent similar appearing mass 4 cm from the nipple measuring 12 mm x 3 mm x 14 mm is retrospectively stable as well. 2) MASS [C] Mammo diagnostic bilateral w 3d & cad: There are no corresponding masses seen on this modality. US breast right limited (diagnostic): There is a 17 mm x 7 mm x 5 mm oval, parallel, hypoechoic mass with circumscribed margins with no posterior features seen in the right breast at 11 o'clock, 7 cm from the nipple. The mass correlates with the prior ultrasound finding.  The lesion is bilobed confirmed on RAD images which retrospectively is stable since the prior study identified on the volumetric sweeps.  Again, morphology favors benign fibroadenoma and  surveillance recommended. Left Mammo diagnostic bilateral w 3d & cad There are no suspicious masses, grouped microcalcifications or areas of unexplained architectural distortion. The skin and nipple areolar complex are unremarkable.     Impression: Stable benign morphology masses right breast probable fibroadenomas.  Continued surveillance recommended to confirm stability. ASSESSMENT/BI-RADS CATEGORY: Left: 1 - Negative Right: 3 - Probably Benign Overall: 3 - Probably Benign RECOMMENDATION:      - Ultrasound in 6 months for the right breast.      - Routine screening mammogram in 1 year for the left breast. Workstation ID: [id number]       I personally reviewed and interpreted the above imaging data.    Discussion/Summary: This is a 42 y/o female who presents today for fibroadenoma surveillance.  These appear stable on imaging, and the patient denies any new or concerning symptoms.  I will plan to see her again in 6 months after repeat imaging is performed.  If these remain stable at that time, I will likely discharge her back to the care of her gynecologist.  She is agreeable to the plan, all questions were answered today.

## 2024-05-20 ENCOUNTER — OFFICE VISIT (OUTPATIENT)
Dept: URGENT CARE | Age: 44
End: 2024-05-20
Payer: COMMERCIAL

## 2024-05-20 VITALS
SYSTOLIC BLOOD PRESSURE: 104 MMHG | BODY MASS INDEX: 27.78 KG/M2 | OXYGEN SATURATION: 98 % | RESPIRATION RATE: 18 BRPM | HEART RATE: 104 BPM | WEIGHT: 147 LBS | TEMPERATURE: 98.5 F | DIASTOLIC BLOOD PRESSURE: 68 MMHG

## 2024-05-20 DIAGNOSIS — J02.0 STREP PHARYNGITIS: Primary | ICD-10-CM

## 2024-05-20 LAB — S PYO AG THROAT QL: POSITIVE

## 2024-05-20 PROCEDURE — 87880 STREP A ASSAY W/OPTIC: CPT | Performed by: PHYSICIAN ASSISTANT

## 2024-05-20 PROCEDURE — 99214 OFFICE O/P EST MOD 30 MIN: CPT | Performed by: PHYSICIAN ASSISTANT

## 2024-05-20 RX ORDER — AZITHROMYCIN 250 MG/1
TABLET, FILM COATED ORAL
Qty: 6 TABLET | Refills: 0 | Status: SHIPPED | OUTPATIENT
Start: 2024-05-20 | End: 2024-05-24

## 2024-05-21 NOTE — PATIENT INSTRUCTIONS
Take antibiotics as prescribed. Make sure to take all the antibiotic even after you start feeling better. If you stop them too soon, you risk developing a resistant infection that is more difficult and expensive to treat. Never save antibiotics or take antibiotics without the recommendation of a healthcare provider or dental professional. Note that if you are taking birth control medications, antibiotics can decrease their effectiveness. Recommend abstinence or back up method while on antibiotics and for 3-5 days after completing the antibiotic course.   You are considered contagious until you have been on the antibiotic for 24 hours. You should not share food or drinks with others and should not kiss on the mouth in that time. You should also stay home from work or school to avoid spreading the infection to others.   You need to switch to a brand new, never used toothbrush and toothpaste tube after 48 hours (or 2 days on the antibiotic) to prevent giving strep back to yourself again.   If symptoms are not improved after 2-3 days on the antibiotics, follow-up with your primary care provider.   If symptoms worsen or new symptoms such as drooling, difficulty swallowing, voice changes, anterior neck pain, or jaw pain develop report to the emergency room as these are symptoms of an abscess having formed in your throat or neck.

## 2024-05-21 NOTE — PROGRESS NOTES
[facility]        NAME: Toyin Lee is a 43 y.o. female  : 1980    MRN: [id number]  DATE: May 20, 2024  TIME: 8:59 PM    Assessment and Plan   Strep pharyngitis [J02.0]  1. Strep pharyngitis  POCT rapid ANTIGEN strepA    azithromycin (ZITHROMAX) 250 mg tablet        Patient presents with symptoms and examination concerning for possible strep.  Rapid strep in clinic is positive.  She will be started on azithromycin to treat as she was recently on amoxicillin for strep.    Patient Instructions     Patient Instructions   Take antibiotics as prescribed. Make sure to take all the antibiotic even after you start feeling better. If you stop them too soon, you risk developing a resistant infection that is more difficult and expensive to treat. Never save antibiotics or take antibiotics without the recommendation of a healthcare provider or dental professional. Note that if you are taking birth control medications, antibiotics can decrease their effectiveness. Recommend abstinence or back up method while on antibiotics and for 3-5 days after completing the antibiotic course.   You are considered contagious until you have been on the antibiotic for 24 hours. You should not share food or drinks with others and should not kiss on the mouth in that time. You should also stay home from work or school to avoid spreading the infection to others.   You need to switch to a brand new, never used toothbrush and toothpaste tube after 48 hours (or 2 days on the antibiotic) to prevent giving strep back to yourself again.   If symptoms are not improved after 2-3 days on the antibiotics, follow-up with your primary care provider.   If symptoms worsen or new symptoms such as drooling, difficulty swallowing, voice changes, anterior neck pain, or jaw pain develop report to the emergency room as these are symptoms of an abscess having formed in your throat or neck.      Follow up with PCP in 3-5 days.  Proceed to  ER if  symptoms worsen.    If tests have been performed at [facility], our office will contact you with results if changes need to be made to the care plan discussed with you at the visit. You can review your full results on  [facility].     Chief Complaint     Chief Complaint   Patient presents with    Sore Throat     Patient finished antibiotics 1 week ago for strep and woke up today with a sore throat again and is here to confirm that its not strep again.          History of Present Illness       43-year-old female presents with concerns for possible strep.  Patient reports that she had strep about 2-1/2 weeks ago and finished antibiotics a week ago.  She states she did switch out her toothbrush after being on the antibiotic for couple days as instructed but did not switch out her toothpaste.  Patient states that this morning she awoke with sore throat.  Patient notes that she feels like she had some postnasal drip yesterday evening.  She denies any runny nose or sinus congestion at this time denies any fever or chills but notes that she has a mild dry cough.     Sore Throat   Associated symptoms include coughing. Pertinent negatives include no congestion or shortness of breath.       Review of Systems   Review of Systems   Constitutional:  Negative for chills and fever.   HENT:  Positive for postnasal drip and sore throat. Negative for congestion and rhinorrhea.    Respiratory:  Positive for cough. Negative for shortness of breath.          Current Medications       Current Outpatient Medications:     azithromycin (ZITHROMAX) 250 mg tablet, Take 2 tablets today then 1 tablet daily x 4 days, Disp: 6 tablet, Rfl: 0    Multiple Vitamin (multivitamin) tablet, Take 1 tablet by mouth daily, Disp: , Rfl:     valACYclovir (VALTREX) 1,000 mg tablet, PRN (Patient not taking: Reported on 10/20/2023), Disp: , Rfl:     Current Allergies     Allergies as of 05/20/2024    (No Known Allergies)            The following portions of  the patient's history were reviewed and updated as appropriate: allergies, current medications, past family history, past medical history, past social history, past surgical history and problem list.     Past Medical History:   Diagnosis Date    AMA (advanced maternal age) multigravida 35+     Herpes simplex virus infection 2018    HPV in female 2018    Migraine     Miscarriage     X1    Palpitations     halter monitor no problems    Varicella     childhood       Past Surgical History:   Procedure Laterality Date     SECTION      DILATION AND CURETTAGE OF UTERUS      [place]  DELIV[place] ONLY Bilateral 2018    Procedure:  SECTION ();  Surgeon: Carmina Zamora DO;  Location: [facility];  Service: Obstetrics    TOTAL HIP ARTHROPLASTY Right 2012    WISDOM TOOTH EXTRACTION         Family History   Problem Relation Age of Onset    Hypertension Mother     Hypothyroidism Mother     Hypothyroidism Father     Asthma Sister     Hypothyroidism Sister     No Known Problems Sister     No Known Problems Daughter     Colon cancer Maternal Grandfather 60    Prostate cancer Maternal Grandfather     Cancer Paternal Grandmother     No Known Problems Paternal Grandfather     No Known Problems Brother     Breast cancer Maternal Aunt 60    Cancer Maternal Aunt         leukemia and brst         Medications have been verified.        Objective   /68   Pulse 104   Temp 98.5 °F (36.9 °C)   Resp 18   Wt 66.7 kg (147 lb)   LMP 2024 (Exact Date)   SpO2 98%   BMI 27.78 kg/m²   Patient's last menstrual period was 2024 (exact date).       Physical Exam     Physical Exam  Vitals and nursing note reviewed.   Constitutional:       General: She is not in acute distress.     Appearance: Normal appearance. She is not ill-appearing or toxic-appearing.   HENT:      Head: Normocephalic and atraumatic.      Jaw: No trismus.      Right Ear: Hearing, tympanic membrane, ear canal and external  ear normal. There is no impacted cerumen. No foreign body.      Left Ear: Hearing, tympanic membrane, ear canal and external ear normal. There is no impacted cerumen. No foreign body.      Nose: No nasal deformity, mucosal edema, congestion or rhinorrhea.      Right Nostril: No foreign body, epistaxis or occlusion.      Left Nostril: No foreign body, epistaxis or occlusion.      Right Turbinates: Not enlarged, swollen or pale.      Left Turbinates: Not enlarged, swollen or pale.      Mouth/Throat:      Lips: Pink. No lesions.      Mouth: Mucous membranes are moist. No injury, oral lesions or angioedema.      Dentition: Normal dentition.      Tongue: No lesions. Tongue does not deviate from midline.      Palate: No mass and lesions.      Pharynx: Uvula midline. Pharyngeal swelling and posterior oropharyngeal erythema present. No oropharyngeal exudate or uvula swelling.      Tonsils: No tonsillar exudate or tonsillar abscesses.      Comments: Mild erythema and swelling to posterior oropharynx with postnasal drip and cobblestoning present  Eyes:      General: Lids are normal. Gaze aligned appropriately. No allergic shiner.     Extraocular Movements: Extraocular movements intact.   Cardiovascular:      Rate and Rhythm: Normal rate and regular rhythm.      Heart sounds: Normal heart sounds, S1 normal and S2 normal. Heart sounds not distant. No murmur heard.     No friction rub. No gallop. No S3 or S4 sounds.   Pulmonary:      Effort: Pulmonary effort is normal.      Breath sounds: Normal breath sounds. No decreased breath sounds, wheezing, rhonchi or rales.      Comments: Patient speaking in full sentences with no increased respiratory effort.   No audible wheezing or stridor.   Lymphadenopathy:      Cervical: Cervical adenopathy present.      Right cervical: No superficial, deep or posterior cervical adenopathy.     Left cervical: Superficial cervical adenopathy present. No deep or posterior cervical adenopathy.  "  Skin:     General: Skin is warm and dry.   Neurological:      Mental Status: She is alert and oriented to person, place, and time.      Coordination: Coordination is intact.      Gait: Gait is intact.   Psychiatric:         Attention and Perception: Attention and perception normal.         Mood and Affect: Mood and affect normal.         Speech: Speech normal.         Behavior: Behavior is cooperative.               Note: Portions of this record may have been created with voice recognition software. Occasional wrong word or \"sound a like\" substitutions may have occurred due to the inherent limitations of voice recognition software. Please read the chart carefully and recognize, using context, where substitutions have occurred.*      "

## 2024-07-11 ENCOUNTER — ANNUAL EXAM (OUTPATIENT)
Dept: OBGYN CLINIC | Facility: CLINIC | Age: 44
End: 2024-07-11
Payer: COMMERCIAL

## 2024-07-11 VITALS
HEIGHT: 61 IN | DIASTOLIC BLOOD PRESSURE: 62 MMHG | WEIGHT: 145.4 LBS | SYSTOLIC BLOOD PRESSURE: 110 MMHG | BODY MASS INDEX: 27.45 KG/M2

## 2024-07-11 DIAGNOSIS — Z12.31 SCREENING MAMMOGRAM, ENCOUNTER FOR: ICD-10-CM

## 2024-07-11 DIAGNOSIS — Z01.419 ROUTINE GYNECOLOGICAL EXAMINATION: ICD-10-CM

## 2024-07-11 DIAGNOSIS — Z01.419 WOMEN'S ANNUAL ROUTINE GYNECOLOGICAL EXAMINATION: Primary | ICD-10-CM

## 2024-07-11 DIAGNOSIS — R92.30 DENSE BREAST: ICD-10-CM

## 2024-07-11 PROCEDURE — G0476 HPV COMBO ASSAY CA SCREEN: HCPCS | Performed by: OBSTETRICS & GYNECOLOGY

## 2024-07-11 PROCEDURE — 99396 PREV VISIT EST AGE 40-64: CPT | Performed by: OBSTETRICS & GYNECOLOGY

## 2024-07-11 PROCEDURE — G0145 SCR C/V CYTO,THINLAYER,RESCR: HCPCS | Performed by: OBSTETRICS & GYNECOLOGY

## 2024-07-11 NOTE — PROGRESS NOTES
"Subjective      Toyin Lee is a 43 y.o. female who presents for annual well woman exam. Periods are regular every 28-30 days, lasting 7 days. No intermenstrual bleeding, spotting, or discharge.    Current contraception: none  History of abnormal Pap smear: no    mgf colon ca  Maunt breast ca      Menstrual History:  OB History          3    Para   2    Term   2       0    AB   1    Living   2         SAB   1    IAB   0    Ectopic   0    Multiple        Live Births   2           Obstetric Comments   Menarche @ 13  First child @ 22              Patient's last menstrual period was 2024 (exact date).  Period Cycle (Days): 28  Period Duration (Days): 7  Period Pattern: Regular  Menstrual Flow: Moderate  Dysmenorrhea: (!) Mild  Dysmenorrhea Symptoms: Cramping    The following portions of the patient's history were reviewed and updated as appropriate: allergies, current medications, past family history, past medical history, past social history, past surgical history, and problem list.    Review of Systems  Review of Systems   Constitutional:  Negative for activity change, appetite change, chills, fatigue and fever.   Respiratory:  Negative for apnea, cough, chest tightness and shortness of breath.    Cardiovascular:  Negative for chest pain, palpitations and leg swelling.   Gastrointestinal:  Negative for abdominal pain, constipation, diarrhea, nausea and vomiting.   Genitourinary:  Negative for difficulty urinating, dysuria, flank pain, frequency, hematuria and urgency.   Neurological:  Negative for dizziness, seizures, syncope, light-headedness, numbness and headaches.   Psychiatric/Behavioral:  Negative for agitation and confusion.           Objective      /62 (BP Location: Left arm, Patient Position: Sitting, Cuff Size: Adult)   Ht 5' 1\" (1.549 m)   Wt 66 kg (145 lb 6.4 oz)   LMP 2024 (Exact Date)   BMI 27.47 kg/m²     Physical Exam  OBGyn Exam     General:   alert and " oriented, in no acute distress, alert, appears stated age, and cooperative   Heart: regular rate and rhythm, S1, S2 normal, no murmur, click, rub or gallop   Lungs: clear to auscultation bilaterally   Abdomen: soft, non-tender, without masses or organomegaly   Vulva: normal   Vagina: normal mucosa, normal discharge   Cervix: no cervical motion tenderness and no lesions   Uterus: normal size   Adnexa:  Breast Exam:  normal adnexa  breasts appear normal, no suspicious masses, no skin or nipple changes or axillary nodes, fibrocystic breast.                Assessment      @well woman@ .    44 yo female  Annual exam   Prior 1 , 1 C/S  Partner genital herpes   Menstrual migraine  Natural planning contraception  Plan   Pap/HPV   Diet/exercise   Calcium/vitamin-D  Mammogram  breast density recommend ABUS ultrasound       All questions answered.     There are no Patient Instructions on file for this visit.

## 2024-07-17 LAB
LAB AP GYN PRIMARY INTERPRETATION: NORMAL
Lab: NORMAL

## 2024-10-30 ENCOUNTER — HOSPITAL ENCOUNTER (OUTPATIENT)
Dept: ULTRASOUND IMAGING | Facility: CLINIC | Age: 44
Discharge: HOME | End: 2024-10-30
Payer: COMMERCIAL

## 2024-10-30 DIAGNOSIS — R92.8 ABNORMAL FINDINGS ON DIAGNOSTIC IMAGING OF BREAST: ICD-10-CM

## 2024-10-30 PROCEDURE — 76642 ULTRASOUND BREAST LIMITED: CPT

## 2024-11-14 ENCOUNTER — OFFICE VISIT (OUTPATIENT)
Dept: SURGICAL ONCOLOGY | Facility: CLINIC | Age: 44
End: 2024-11-14
Payer: COMMERCIAL

## 2024-11-14 VITALS
BODY MASS INDEX: 28.51 KG/M2 | OXYGEN SATURATION: 99 % | HEIGHT: 61 IN | HEART RATE: 79 BPM | DIASTOLIC BLOOD PRESSURE: 60 MMHG | TEMPERATURE: 96.8 F | WEIGHT: 151 LBS | SYSTOLIC BLOOD PRESSURE: 104 MMHG

## 2024-11-14 DIAGNOSIS — N63.11 MASS OF UPPER OUTER QUADRANT OF RIGHT BREAST: Primary | ICD-10-CM

## 2024-11-14 PROCEDURE — 99212 OFFICE O/P EST SF 10 MIN: CPT

## 2024-11-14 NOTE — ASSESSMENT & PLAN NOTE
All three masses are stable on recent US.  Since she is low risk, I have advised her to continue care with her gynecologist as long as US in 6 months shows continued stability.

## 2024-11-14 NOTE — PROGRESS NOTES
Surgical Oncology Follow Up       [address] [facility] SURGICAL ONCOLOGY [facility]  [address] [facility] [address]    Toyin Lee  1980  [id number]  [address] [facility] SURGICAL ONCOLOGY [facility]ULIUS  [address] [facility] [address]    1. Mass of upper outer quadrant of right breast  Assessment & Plan:  All three masses are stable on recent US.  Since she is low risk, I have advised her to continue care with her gynecologist as long as US in 6 months shows continued stability.         Chief Complaint   Patient presents with    Follow-up       Return for new concerns or symptoms.      History of Present Illness: This is a 45 y/o female who returns to the office today in follow-up for her several right breast masses. She denies any change on self-exam, and is not experiencing any pain.  She has no complaints at this time. Repeat US was performed on , BIRADS-3. Repeat US is recommended along with diagnostic mammogram in 6 months, which is already scheduled.      Review of Systems   Constitutional:  Negative for activity change, appetite change, fatigue and unexpected weight change.   HENT: Negative.     Respiratory: Negative.     Cardiovascular: Negative.    Gastrointestinal: Negative.    Musculoskeletal: Negative.    Skin: Negative.  Negative for color change and wound.   Neurological: Negative.    Hematological: Negative.  Negative for adenopathy.   Psychiatric/Behavioral: Negative.               Patient Active Problem List   Diagnosis    Herpes simplex virus infection    HPV in female    Sinus tachycardia     delivery delivered    Cyst of right breast    Mass of upper outer quadrant of right breast    Dense breast     Past Medical History:   Diagnosis Date    AMA (advanced maternal age) multigravida 35+     Herpes simplex virus infection 2018    HPV in female 2018    Migraine     Miscarriage      X1    Palpitations     halter monitor no problems    Varicella     childhood     Past Surgical History:   Procedure Laterality Date     SECTION      DILATION AND CURETTAGE OF [place]TERUS      [place] Bilateral 2018    Procedure:  SECTION ();  Surgeon: Carmina Zamora DO;  Location: [facility];  Service: Obstetrics    TOTAL HIP ARTHROPLASTY Right 2012    WISDOM TOOTH EXTRACTION       Family History   Problem Relation Age of Onset    Hypertension Mother     Hypothyroidism Mother     Hypothyroidism Father     Asthma Sister     Hypothyroidism Sister     No Known Problems Sister     No Known Problems Daughter     Colon cancer Maternal Grandfather 60    Prostate cancer Maternal Grandfather     Cancer Paternal Grandmother     No Known Problems Paternal Grandfather     No Known Problems Brother     Breast cancer Maternal Aunt 60    Cancer Maternal Aunt         leukemia and brst     Social History     Socioeconomic History    Marital status: /Civil Union     Spouse name: Clarence    Number of children: Not on file    Years of education: Not on file    Highest education level: Not on file   Occupational History    Occupation:    Tobacco Use    Smoking status: Never     Passive exposure: Never    Smokeless tobacco: Never   Vaping Use    Vaping status: Never Used   Substance and Sexual Activity    Alcohol use: Not Currently    Drug use: No    Sexual activity: Yes     Partners: Male     Birth control/protection: None   Other Topics Concern    Not on file   Social History Narrative    Not on file     Social Drivers of Health     Financial Resource Strain: Not on file   Food Insecurity: Not on file   Transportation Needs: Not on file   Physical Activity: Not on file   Stress: Not on file   Social Connections: Not on file   Intimate Partner Violence: Not on file   Housing Stability: Not on file       Current Outpatient Medications:     valACYclovir (VALTREX) 1,000 mg  tablet, PRN, Disp: , Rfl:     Multiple Vitamin (multivitamin) tablet, Take 1 tablet by mouth daily, Disp: , Rfl:   No Known Allergies  Vitals:    11/14/24 0902   BP: 104/60   Pulse: 79   Temp: (!) 96.8 °F (36 °C)   SpO2: 99%       Physical Exam  Vitals reviewed.   Constitutional:       General: She is not in acute distress.     Appearance: Normal appearance. She is normal weight. She is not ill-appearing or toxic-appearing.   HENT:      Head: Normocephalic and atraumatic.      Nose: Nose normal.      Mouth/Throat:      Mouth: Mucous membranes are moist.   Eyes:      General: No scleral icterus.  Cardiovascular:      Rate and Rhythm: Normal rate.   Pulmonary:      Effort: Pulmonary effort is normal.   Chest:          Comments: Palpable masses present in the right breast UOQ without change.  There are no additional masses, nodules, skin changes or tenderness present bilaterally.  I do not appreciate any adenopathy.  Musculoskeletal:         General: Normal range of motion.      Cervical back: Normal range of motion and neck supple.   Lymphadenopathy:      Cervical: No cervical adenopathy.   Skin:     General: Skin is warm and dry.   Neurological:      General: No focal deficit present.      Mental Status: She is alert and oriented to person, place, and time.   Psychiatric:         Mood and Affect: Mood normal.         Behavior: Behavior normal.         Thought Content: Thought content normal.         Judgment: Judgment normal.             Imaging  US breast right limited (diagnostic)  Result Date: 10/30/2024  Narrative: DIAGNOSIS: Abnormal findings on diagnostic imaging of breast TECHNIQUE: Ultrasound of the right breast(s) was performed. COMPARISONS: Prior breast imaging dated: 04/30/2024, 04/30/2024, 10/16/2023, 10/09/2023, 05/30/2023, 05/30/2023, 05/28/2022, 04/14/2021, 04/14/2021, and 04/07/2021 RELEVANT HISTORY: Family Breast Cancer History: History of breast cancer in Maternal Aunt. Family Medical History:  Family medical history includes breast cancer in maternal aunt and colon cancer in maternal grandfather. Personal History: No known relevant hormone history. No known relevant surgical history. No known relevant medical history. RISK ASSESSMENT: 5 Year Tyrer-Cuzick: 1.17% 10 Year Tyrer-Cuzick: 2.8% Lifetime Tyrer-Cuzick: 17.03% INDICATION: Toyin Lee is a 44 y.o. female presenting for RIGHT US. FINDINGS: RIGHT 1) MASS [B]: Ultrasound of the right breast at the 12 o'clock position 3 cm from the nipple demonstrates stable appearance of the circumscribed hypoechoic mass which measures 9 x 8 x 4 mm, previously measuring 9 x 8 x 4 mm on 4/30/2024. 2) MASS [C]: Ultrasound of the right breast at the 11 o'clock position 7 cm from the nipple demonstrates stable appearance of the lobulated hypoechoic mass which measures 16 x 5 x 7 mm, previously measuring 17 x 5 x 7 mm on 4/30/2024. 3) MASS [D]: Ultrasound of the right breast at the 12 o'clock position 4 cm from the nipple demonstrates stable appearance of the circumscribed hypoechoic mass which measures 11 x 4 x 12 mm, previously measuring 12 x 3 x 14 mm on 4/30/2024.     Impression:  6-month stability of the probably benign masses in the right breast, as described above. ASSESSMENT/BI-RADS CATEGORY: Right: 3 - Probably Benign Overall: 3 - Probably Benign RECOMMENDATION:      - Ultrasound in 6 months for the right breast.      - Diagnostic mammogram in 6 months for both breasts. Workstation ID: [id number] Signed by:  DO BRYSON Smiley personally reviewed and interpreted the above laboratory and imaging data.

## 2024-12-01 ENCOUNTER — HOSPITAL ENCOUNTER (EMERGENCY)
Facility: HOSPITAL | Age: 44
Discharge: HOME | End: 2024-12-01
Attending: EMERGENCY MEDICINE
Payer: COMMERCIAL

## 2024-12-01 ENCOUNTER — APPOINTMENT (EMERGENCY)
Dept: RADIOLOGY | Facility: HOSPITAL | Age: 44
End: 2024-12-01
Payer: COMMERCIAL

## 2024-12-01 VITALS
SYSTOLIC BLOOD PRESSURE: 99 MMHG | DIASTOLIC BLOOD PRESSURE: 57 MMHG | TEMPERATURE: 97.5 F | HEART RATE: 80 BPM | RESPIRATION RATE: 16 BRPM | OXYGEN SATURATION: 99 %

## 2024-12-01 DIAGNOSIS — R07.9 CHEST PAIN, UNSPECIFIED: Primary | ICD-10-CM

## 2024-12-01 DIAGNOSIS — M54.50 LOWER BACK PAIN: ICD-10-CM

## 2024-12-01 LAB
ALBUMIN SERPL BCG-MCNC: 4.3 G/DL (ref 3.5–5)
ALP SERPL-CCNC: 126 U/L (ref 34–104)
ALT SERPL W P-5'-P-CCNC: 19 U/L (ref 7–52)
ANION GAP SERPL CALCULATED.3IONS-SCNC: 5 MMOL/L (ref 4–13)
AST SERPL W P-5'-P-CCNC: 20 U/L (ref 13–39)
ATRIAL RATE: 75 BPM
BASOPHILS # BLD AUTO: 0.04 THOUSANDS/ΜL (ref 0–0.1)
BASOPHILS NFR BLD AUTO: 1 % (ref 0–1)
BILIRUB SERPL-MCNC: 0.53 MG/DL (ref 0.2–1)
BUN SERPL-MCNC: 9 MG/DL (ref 5–25)
CALCIUM SERPL-MCNC: 8.8 MG/DL (ref 8.4–10.2)
CARDIAC TROPONIN I PNL SERPL HS: <2 NG/L (ref ?–50)
CHLORIDE SERPL-SCNC: 103 MMOL/L (ref 96–108)
CO2 SERPL-SCNC: 27 MMOL/L (ref 21–32)
CREAT SERPL-MCNC: 0.63 MG/DL (ref 0.6–1.3)
EOSINOPHIL # BLD AUTO: 0.06 THOUSAND/ΜL (ref 0–0.61)
EOSINOPHIL NFR BLD AUTO: 1 % (ref 0–6)
ERYTHROCYTE [DISTWIDTH] IN BLOOD BY AUTOMATED COUNT: 13.1 % (ref 11.6–15.1)
GFR SERPL CREATININE-BSD FRML MDRD: 110 ML/MIN/1.73SQ M
GLUCOSE SERPL-MCNC: 91 MG/DL (ref 65–140)
HCT VFR BLD AUTO: 39.7 % (ref 34.8–46.1)
HGB BLD-MCNC: 12.6 G/DL (ref 11.5–15.4)
IMM GRANULOCYTES # BLD AUTO: 0.01 THOUSAND/UL (ref 0–0.2)
IMM GRANULOCYTES NFR BLD AUTO: 0 % (ref 0–2)
LIPASE SERPL-CCNC: 17 U/L (ref 11–82)
LYMPHOCYTES # BLD AUTO: 1.9 THOUSANDS/ΜL (ref 0.6–4.47)
LYMPHOCYTES NFR BLD AUTO: 28 % (ref 14–44)
MCH RBC QN AUTO: 27.7 PG (ref 26.8–34.3)
MCHC RBC AUTO-ENTMCNC: 31.7 G/DL (ref 31.4–37.4)
MCV RBC AUTO: 87 FL (ref 82–98)
MONOCYTES # BLD AUTO: 0.41 THOUSAND/ΜL (ref 0.17–1.22)
MONOCYTES NFR BLD AUTO: 6 % (ref 4–12)
NEUTROPHILS # BLD AUTO: 4.32 THOUSANDS/ΜL (ref 1.85–7.62)
NEUTS SEG NFR BLD AUTO: 64 % (ref 43–75)
NRBC BLD AUTO-RTO: 0 /100 WBCS
P AXIS: 86 DEGREES
PLATELET # BLD AUTO: 319 THOUSANDS/UL (ref 149–390)
PMV BLD AUTO: 9.1 FL (ref 8.9–12.7)
POTASSIUM SERPL-SCNC: 4.1 MMOL/L (ref 3.5–5.3)
PR INTERVAL: 156 MS
PROT SERPL-MCNC: 7.6 G/DL (ref 6.4–8.4)
QRS AXIS: 98 DEGREES
QRSD INTERVAL: 86 MS
QT INTERVAL: 376 MS
QTC INTERVAL: 419 MS
RBC # BLD AUTO: 4.55 MILLION/UL (ref 3.81–5.12)
SODIUM SERPL-SCNC: 135 MMOL/L (ref 135–147)
T WAVE AXIS: 83 DEGREES
VENTRICULAR RATE: 75 BPM
WBC # BLD AUTO: 6.74 THOUSAND/UL (ref 4.31–10.16)

## 2024-12-01 PROCEDURE — 96374 THER/PROPH/DIAG INJ IV PUSH: CPT

## 2024-12-01 PROCEDURE — 85025 COMPLETE CBC W/AUTO DIFF WBC: CPT | Performed by: EMERGENCY MEDICINE

## 2024-12-01 PROCEDURE — 83690 ASSAY OF LIPASE: CPT

## 2024-12-01 PROCEDURE — 99285 EMERGENCY DEPT VISIT HI MDM: CPT

## 2024-12-01 PROCEDURE — 93010 ELECTROCARDIOGRAM REPORT: CPT | Performed by: INTERNAL MEDICINE

## 2024-12-01 PROCEDURE — 71046 X-RAY EXAM CHEST 2 VIEWS: CPT

## 2024-12-01 PROCEDURE — 93005 ELECTROCARDIOGRAM TRACING: CPT

## 2024-12-01 PROCEDURE — 80053 COMPREHEN METABOLIC PANEL: CPT | Performed by: EMERGENCY MEDICINE

## 2024-12-01 PROCEDURE — 99285 EMERGENCY DEPT VISIT HI MDM: CPT | Performed by: EMERGENCY MEDICINE

## 2024-12-01 PROCEDURE — 36415 COLL VENOUS BLD VENIPUNCTURE: CPT

## 2024-12-01 PROCEDURE — 84484 ASSAY OF TROPONIN QUANT: CPT | Performed by: EMERGENCY MEDICINE

## 2024-12-01 RX ORDER — LIDOCAINE 50 MG/G
1 PATCH TOPICAL ONCE
Status: DISCONTINUED | OUTPATIENT
Start: 2024-12-01 | End: 2024-12-01 | Stop reason: HOSPADM

## 2024-12-01 RX ORDER — KETOROLAC TROMETHAMINE 30 MG/ML
15 INJECTION, SOLUTION INTRAMUSCULAR; INTRAVENOUS ONCE
Status: DISCONTINUED | OUTPATIENT
Start: 2024-12-01 | End: 2024-12-01

## 2024-12-01 RX ORDER — KETOROLAC TROMETHAMINE 30 MG/ML
15 INJECTION, SOLUTION INTRAMUSCULAR; INTRAVENOUS ONCE
Status: COMPLETED | OUTPATIENT
Start: 2024-12-01 | End: 2024-12-01

## 2024-12-01 RX ADMIN — LIDOCAINE 1 PATCH: 700 PATCH TOPICAL at 13:54

## 2024-12-01 RX ADMIN — KETOROLAC TROMETHAMINE 15 MG: 30 INJECTION, SOLUTION INTRAMUSCULAR; INTRAVENOUS at 15:12

## 2024-12-01 NOTE — DISCHARGE INSTRUCTIONS
You were evaluated in the emergency department for: chest pain. You can access your current and pending results through [employer]. A radiologist will take a second look at your X-Rays, if you had any, and you will be contacted with any new findings.     - You should follow-up with your primary care provider, as soon as possible, for re-evaluation.  - I have also referred you to cardiology for follow-up  - I have also referred you to comprehensive spine for your back pain.    Please, return to the emergency department if you experience new or worsening symptoms, fever, chest pain, shortness of breath, difficulty breathing, dizziness, abdominal pain, persistent nausea/vomiting, syncope or passing out, blood in your urine or stool, coughing up blood, leg swelling/pain, urinary retention, bowel or bladder incontinence, numbness between your legs.

## 2024-12-01 NOTE — ED ATTENDING ATTESTATION
12/1/2024  IRhiannon MD, saw and evaluated the patient. I have discussed the patient with the resident/non-physician practitioner and agree with the resident's/non-physician practitioner's findings, Plan of Care, and MDM as documented in the resident's/non-physician practitioner's note, except where noted. All available labs and Radiology studies were reviewed.  I was present for key portions of any procedure(s) performed by the resident/non-physician practitioner and I was immediately available to provide assistance.       At this point I agree with the current assessment done in the Emergency Department.  I have conducted an independent evaluation of this patient a history and physical is as follows:    45 yo female without significant PMH p/w intermittent chest pain worse with movement/deep breathing over last several days.   Also has low back pain.  No URI symptoms, no fever.  No cough.  Denies LE pain/swelling/recent travel/exogenous hormone/personal or familial history of VTE.  No exertional component.  On exam pt well appearing, no murmur, pulse differential, clinical signs of volume overload or VTE.  Lungs are clear.  Belly benign.  Labs and EKG similarly reassuring.  Improved symptoms with ED treatment.  Pt dnies risk factors of CAD including HTN, HLD, DM, smoking history or first degree family members with early cardiac history.  Presentation favors msk etiology.  Safe for dispo to home with symptomatic treatment close /fu.  RTER precautions discussed and documented on discharge paperwork, pt and family endorsed good understanding of reasons to return.         ED Course  ED Course as of 12/01/24 1614   Sun Dec 01, 2024   1401 LIPASE: 17  wnl   1614 ECG 12 lead  Rightward axis  Pulmonary disease pattern  Abnormal ECG  No previous ECGs available  Confirmed by Paulina Lee ([id number]) on 12/1/2024 3:42:07 PM     1614 XR chest 2 views  IMPRESSION:     No acute cardiopulmonary disease.            Workstation performed: [id number]         Exam Ended: 12/01/24 13:20       1614 Comprehensive metabolic panel(!)  Reassuring, no end organ damage, no AG, normal bicarb.       1614 CBC and differential  No significant leukocytosis reassuring diff, normal H/H, normal platelets.      1614 LIPASE: 17  wnl   1614 hs TnI 0hr: <2  wnl         Critical Care Time  Procedures

## 2024-12-01 NOTE — ED PROVIDER NOTES
Time reflects when diagnosis was documented in both MDM as applicable and the Disposition within this note       Time User Action Codes Description Comment    12/1/2024  2:53 PM Rahel Ritchie Add [R07.9] Chest pain, unspecified     12/1/2024  3:30 PM Rahel Ritchie Add [M54.50] Lower back pain           ED Disposition       ED Disposition   Discharge    Condition   Stable    Date/Time   Sun Dec 1, 2024  2:39 PM    Comment   Toyni Lee discharge to home/self care.                   Assessment & Plan       Medical Decision Making  Toyin Lee is a 44 y.o. female presenting with positional non-exertional, non-pleuritic substernal chest pain. No nausea, no SOB, no neurologic symptoms. Vitals unremarkable. Exam remarkable for slight tenderness to palpation over anterior chest wall, increased pain with twisting. Also tenderness to palpation without palpable deformities to right lower paraspinal muscle in lumbar region, negative straight leg raise.    DDX including but not limited to: ACS, MI, PE, PTX, pneumonia, dissection, pleurisy, pericarditis, myocarditis, rhabdomyolysis, GI etiology.    Plan:  - Will get ECG to evaluate for arrhythmia and signs of ischemia and other cause of symptoms  - Will examine CBC to evaluate for hematologic abnormalities and infection  - Will examine CMP to evaluate for metabolic abnormalities  - Will examine lipase for pancreatitis  - Will examine Troponins  - Will get CXR to evaluate for cardiopulmonary causes  - Will give toradol and lidocaine for pain    Will continue to monitor while patient is in the ED and reconsider further evaluation or intervention as needed.    Will continue to monitor while patient is in the ED and reconsider further evaluation or intervention as needed.    See ED course for further updates and interpretation of results.    Based on these results and H&P, suspect MSK vs GI source of chest pain. Doubt cardiac etiology, Heart Score 1 for story.  Wells and PERC negative. Pain improved during time in the ED. Will refer to cardiology for further evaluation of the chest pain. Will also refer to comprehensive spine for evaluation of back pain.    Results, clinical impressions, and plan were discussed with patient and family. They expressed understanding and were in agreement with plan. Patient was given the opportunity to ask questions in ED. All questions and concerns were addressed in ED.    After evaluation and workup in the emergency department Patient appears well, is nontoxic appearing, expresses understanding and agrees with plan of care at this time.  In light of this patient would benefit from outpatient management. Discussed strict return precautions.  Discussed importance of following up with PCP in the next few days.  All questions answered.  Patient is agreeable to discharge.    Amount and/or Complexity of Data Reviewed  Independent Historian:      Details: Daughter  External Data Reviewed: labs and notes.  Labs: ordered. Decision-making details documented in ED Course.  Radiology: ordered and independent interpretation performed. Decision-making details documented in ED Course.  ECG/medicine tests: ordered and independent interpretation performed.    Risk  Prescription drug management.        ED Course as of 12/01/24 1850   Sun Dec 01, 2024   1244 hs TnI 0hr: <2  Negative, pain ongoing >2 days, current episode >3hr   1244 Comprehensive metabolic panel(!)  Grossly normal except for alk phos slightly elevated at 126 which is nonspecific   1244 CBC and differential  Normal   1244 Blood Pressure: 114/62   1315 Wells and PERC negative   1324 XR chest 2 views  No acute cardiopulmonary disease   1353 Pending Lipase. After this    1401 LIPASE: 17  Normal       Medications   ketorolac (TORADOL) injection 15 mg (15 mg Intravenous Given 12/1/24 1512)       ED Risk Strat Scores   HEART Risk Score      Flowsheet Row Most Recent Value   Heart Score Risk  Calculator    History 1 Filed at: 12/01/2024 1609   ECG 0 Filed at: 12/01/2024 1609   Age 0 Filed at: 12/01/2024 1609   Risk Factors 0 Filed at: 12/01/2024 1609   Troponin 0 Filed at: 12/01/2024 1609   HEART Score 1 Filed at: 12/01/2024 1609                           PERC Rule for PE      Flowsheet Row Most Recent Value   PERC Rule for PE    Age >=50 0 Filed at: 12/01/2024 1315   HR >=100 0 Filed at: 12/01/2024 1315   O2 Sat on room air < 95% 0 Filed at: 12/01/2024 1315   History of PE or DVT 0 Filed at: 12/01/2024 1315   Recent trauma or surgery 0 Filed at: 12/01/2024 1315   Hemoptysis 0 Filed at: 12/01/2024 1315   Exogenous estrogen 0 Filed at: 12/01/2024 1315   Unilateral leg swelling 0 Filed at: 12/01/2024 1315   PERC Rule for PE Results 0 Filed at: 12/01/2024 1315                Wells' Criteria for PE      Flowsheet Row Most Recent Value   Wells' Criteria for PE    Clinical signs and symptoms of DVT 0 Filed at: 12/01/2024 1315   PE is primary diagnosis or equally likely 0 Filed at: 12/01/2024 1315   HR >100 0 Filed at: 12/01/2024 1315   Immobilization at least 3 days or Surgery in the previous 4 weeks 0 Filed at: 12/01/2024 1315   Previous, objectively diagnosed PE or DVT 0 Filed at: 12/01/2024 1315   Hemoptysis 0 Filed at: 12/01/2024 1315   Malignancy with treatment within 6 months or palliative 0 Filed at: 12/01/2024 1315   Wells' Criteria Total 0 Filed at: 12/01/2024 1315                        History of Present Illness       Chief Complaint   Patient presents with    Chest Pain     Midsternal chest pressure. Intermittent for 2 days. Movement exacerbates. Not worse with breathing or eating. No nausea vomiting, lightheadedness. 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    Migraine     Miscarriage     X1    Palpitations     halter monitor no problems    Varicella      childhood      Past Surgical History:   Procedure Laterality Date     SECTION      DILATION AND CURETTAGE OF UTERUS      [place]  DELIVERY ONLY Bilateral 2018    Procedure:  SECTION ();  Surgeon: Carmina Zamora DO;  Location: [facility];  Service: Obstetrics    TOTAL HIP ARTHROPLASTY Right 2012    WISDOM TOOTH EXTRACTION        Family History   Problem Relation Age of Onset    Hypertension Mother     Hypothyroidism Mother     Hypothyroidism Father     Asthma Sister     Hypothyroidism Sister     No Known Problems Sister     No Known Problems Daughter     Colon cancer Maternal Grandfather 60    Prostate cancer Maternal Grandfather     Cancer Paternal Grandmother     No Known Problems Paternal Grandfather     No Known Problems Brother     Breast cancer Maternal Aunt 60    Cancer Maternal Aunt         leukemia and brst      Social History     Tobacco Use    Smoking status: Never     Passive exposure: Never    Smokeless tobacco: Never   Vaping Use    Vaping status: Never Used   Substance Use Topics    Alcohol use: Not Currently    Drug use: No      E-Cigarette/Vaping    E-Cigarette Use Never User       E-Cigarette/Vaping Substances    Nicotine No     THC No     CBD No     Flavoring No     Other No     Unknown No       I have reviewed and agree with the history as documented.     HPI    Toyin Lee is a 44 y.o. female without pertinent past medical history presenting for chest pain.    Patient reports about 2-3 days of non-radiating, non-exertional, non-pleuritic chest pain in the mid sternal region, worse with motion especially twisting and lifting. No shortness of breath, no nausea, no numbness, tingling or weakness. Also has right-sided lower back pain starting at around the same time that is not present when she is not moving. Has been taking ibuprofen and tylenol with some improvement.    No active cancer, not bed ridden, no calf swelling, no collateral superficial veins, entire  leg is not swollen, no localized tenderness along the deep venous system, no pitting edema confined to the symptomatic leg, no paralysis paresis or recent plaster immobilization, no history DVT. Patient currently denies fevers, chills, headaches, dizziness, palpitations, shortness of breath, abdominal pain, nausea, vomiting, constipation, diarrhea, urinary frequency, dysuria, and new extremity weakness, swelling and pain. No recent illnesses, no medication changes.    Review of Systems   Constitutional:  Negative for chills and fever.   HENT:  Negative for congestion, hearing loss and trouble swallowing.    Eyes:  Negative for pain and visual disturbance.   Respiratory:  Negative for cough and shortness of breath.    Cardiovascular:  Positive for chest pain. Negative for palpitations and leg swelling.   Gastrointestinal:  Negative for abdominal pain, constipation, diarrhea, nausea and vomiting.   Genitourinary:  Negative for dysuria, frequency and hematuria.   Musculoskeletal:  Positive for back pain. Negative for arthralgias.   Skin:  Negative for color change and rash.   Neurological:  Negative for dizziness, seizures, syncope, weakness, light-headedness and headaches.   Psychiatric/Behavioral:  Negative for dysphoric mood.    All other systems reviewed and are negative.          Objective       ED Triage Vitals [12/01/24 1156]   Temperature Pulse Blood Pressure Respirations SpO2 Patient Position - Orthostatic VS   97.5 °F (36.4 °C) 79 114/62 18 100 % Sitting      Temp Source Heart Rate Source BP Location FiO2 (%) Pain Score    Oral Monitor Right arm -- --      Vitals      Date and Time Temp Pulse SpO2 Resp BP Pain Score FACES Pain Rating User   12/01/24 1514 -- 80 99 % 16 99/57 -- -- KM   12/01/24 1156 97.5 °F (36.4 °C) 79 100 % 18 114/62 -- -- TP            Physical Exam  Vitals and nursing note reviewed.   Constitutional:       General: She is not in acute distress.     Appearance: She is well-developed.    HENT:      Head: Normocephalic and atraumatic.      Mouth/Throat:      Mouth: Mucous membranes are moist.      Pharynx: Oropharynx is clear.   Eyes:      Extraocular Movements: Extraocular movements intact.      Conjunctiva/sclera: Conjunctivae normal.      Pupils: Pupils are equal, round, and reactive to light.   Cardiovascular:      Rate and Rhythm: Normal rate and regular rhythm.      Pulses: Normal pulses.      Heart sounds: Normal heart sounds. No murmur heard.  Pulmonary:      Effort: Pulmonary effort is normal. No respiratory distress.      Breath sounds: Normal breath sounds. No wheezing, rhonchi or rales.   Abdominal:      General: Abdomen is flat.      Palpations: Abdomen is soft.      Tenderness: There is no abdominal tenderness.   Musculoskeletal:      Cervical back: Normal and normal range of motion.      Thoracic back: Normal.      Lumbar back: Tenderness present. No swelling, spasms or bony tenderness. Negative right straight leg raise test and negative left straight leg raise test.      Right lower leg: No swelling or tenderness. No edema.      Left lower leg: No swelling or tenderness. No edema.   Skin:     General: Skin is warm and dry.      Capillary Refill: Capillary refill takes less than 2 seconds.   Neurological:      General: No focal deficit present.      Mental Status: She is alert and oriented to person, place, and time.      Sensory: No sensory deficit.      Motor: No weakness.   Psychiatric:         Mood and Affect: Mood normal.         Behavior: Behavior normal.         Results Reviewed       Procedure Component Value Units Date/Time    Lipase [[id number]]  (Normal) Collected: 12/01/24 1158    Lab Status: Final result Specimen: Blood from Arm, Right Updated: 12/01/24 1358     Lipase 17 u/L     HS Troponin 0hr (reflex protocol) [[id number]]  (Normal) Collected: 12/01/24 1158    Lab Status: Final result Specimen: Blood from Arm, Right Updated: 12/01/24 1238     hs TnI 0hr <2 ng/L      Comprehensive metabolic panel [[id number]]  (Abnormal) Collected: 12/01/24 1158    Lab Status: Final result Specimen: Blood from Arm, Right Updated: 12/01/24 1236     Sodium 135 mmol/L      Potassium 4.1 mmol/L      Chloride 103 mmol/L      CO2 27 mmol/L      ANION GAP 5 mmol/L      BUN 9 mg/dL      Creatinine 0.63 mg/dL      Glucose 91 mg/dL      Calcium 8.8 mg/dL      AST 20 U/L      ALT 19 U/L      Alkaline Phosphatase 126 U/L      Total Protein 7.6 g/dL      Albumin 4.3 g/dL      Total Bilirubin 0.53 mg/dL      eGFR 110 ml/min/1.73sq m     Narrative:      National Kidney Disease Foundation guidelines for Chronic Kidney Disease (CKD):     Stage 1 with normal or high GFR (GFR > 90 mL/min/1.73 square meters)    Stage 2 Mild CKD (GFR = 60-89 mL/min/1.73 square meters)    Stage 3A Moderate CKD (GFR = 45-59 mL/min/1.73 square meters)    Stage 3B Moderate CKD (GFR = 30-44 mL/min/1.73 square meters)    Stage 4 Severe CKD (GFR = 15-29 mL/min/1.73 square meters)    Stage 5 End Stage CKD (GFR <15 mL/min/1.73 square meters)  Note: GFR calculation is accurate only with a steady state creatinine    CBC and differential [[id number]] Collected: 12/01/24 1158    Lab Status: Final result Specimen: Blood from Arm, Right Updated: 12/01/24 1213     WBC 6.74 Thousand/uL      RBC 4.55 Million/uL      Hemoglobin 12.6 g/dL      Hematocrit 39.7 %      MCV 87 fL      MCH 27.7 pg      MCHC 31.7 g/dL      RDW 13.1 %      MPV 9.1 fL      Platelets 319 Thousands/uL      nRBC 0 /100 WBCs      Segmented % 64 %      Immature Grans % 0 %      Lymphocytes % 28 %      Monocytes % 6 %      Eosinophils Relative 1 %      Basophils Relative 1 %      Absolute Neutrophils 4.32 Thousands/µL      Absolute Immature Grans 0.01 Thousand/uL      Absolute Lymphocytes 1.90 Thousands/µL      Absolute Monocytes 0.41 Thousand/µL      Eosinophils Absolute 0.06 Thousand/µL      Basophils Absolute 0.04 Thousands/µL             XR chest 2 views   ED Interpretation by  Rhiannon Briseno MD (12/01 1323)   NAD      Final Interpretation by Gilbert Li MD (12/01 1609)      No acute cardiopulmonary disease.            Workstation performed: [id number]             ECG 12 Lead Documentation Only    Date/Time: 12/1/2024 1:19 PM    Performed by: Rahel Ritchie MD  Authorized by: Rahel Ritchie MD    Indications / Diagnosis:  Chest pain  ECG reviewed by me, the ED Provider: yes    Patient location:  ED  Previous ECG:     Previous ECG:  Unavailable    Comparison to cardiac monitor: Yes    Interpretation:     Interpretation: non-specific    Rate:     ECG rate:  75    ECG rate assessment: normal    Rhythm:     Rhythm: sinus rhythm    Ectopy:     Ectopy: none    QRS:     QRS axis:  Right    QRS intervals:  Normal  Conduction:     Conduction: normal    ST segments:     ST segments:  Normal  T waves:     T waves: non-specific    Comments:      QTc 419      ED Medication and Procedure Management   Prior to Admission Medications   Prescriptions Last Dose Informant Patient Reported? Taking?   Multiple Vitamin (multivitamin) tablet  Self Yes No   Sig: Take 1 tablet by mouth daily   valACYclovir (VALTREX) 1,000 mg tablet  Self Yes No   Sig: PRN      Facility-Administered Medications: None     Discharge Medication List as of 12/1/2024  3:31 PM        CONTINUE these medications which have NOT CHANGED    Details   Multiple Vitamin (multivitamin) tablet Take 1 tablet by mouth daily, Historical Med      valACYclovir (VALTREX) 1,000 mg tablet PRN, Starting Fri 1/20/2023, Historical Med             ED SEPSIS DOCUMENTATION   Time reflects when diagnosis was documented in both MDM as applicable and the Disposition within this note       Time User Action Codes Description Comment    12/1/2024  2:53 PM Rahel Ritchie Add [R07.9] Chest pain, unspecified     12/1/2024  3:30 PM Rahel Ritchie Add [M54.50] Lower back pain             Portions of the above record have been created with voice  "recognition software.  Occasional wrong word or \"sound alike\" substitutions may have occurred due to the inherent limitations of voice recognition software.  Read the chart carefully and recognize, using context, where substitutions may have occurred.     Rahel Ritchie MD  12/01/24 1898    "

## 2024-12-02 ENCOUNTER — NURSE TRIAGE (OUTPATIENT)
Dept: PHYSICAL THERAPY | Facility: OTHER | Age: 44
End: 2024-12-02

## 2024-12-02 ENCOUNTER — TELEPHONE (OUTPATIENT)
Dept: PHYSICAL THERAPY | Facility: OTHER | Age: 44
End: 2024-12-02

## 2024-12-02 NOTE — TELEPHONE ENCOUNTER
Call placed to the patient per [facility] referral.    Spoke with patient, explained program and reason for the call.    Patient declined services.    [facility] referral closed per protocol.

## 2024-12-04 ENCOUNTER — NURSE TRIAGE (OUTPATIENT)
Dept: PHYSICAL THERAPY | Facility: OTHER | Age: 44
End: 2024-12-04

## 2024-12-04 NOTE — TELEPHONE ENCOUNTER
Referral is closed. Patient declined on 12/02. But pain worsened today and decided to call back.    Additional Information   Negative: Is this related to a work injury?   Negative: Is this related to an MVA?   Negative: Are you currently recieving homecare services?    Background - Initial Assessment  Clinical complaint: ED visit on 12/01 due to Middle-Lower Back Pain that started on Friday 11/29 morning. Hx of right hip replacement 12 years ago. Patient states pain does not radiates down her legs, she does feel tightness in her upper back. No numbness or tingling. NKI. Not seeing a spine specialist for this pain. Patient states pain is constant and persistent. Worse with movement. Patient described pain as sharp.  Date of onset: Friday 11/29/24.  Frequency of pain: constant, persistent.  Quality of pain: sharp.    Protocols used: [facility] Protocol

## 2024-12-06 NOTE — TELEPHONE ENCOUNTER
This nurse called the patient to proceed with the triage initiated.  RN was notified of patients call to CS after the prior decline. Please see previous CS phone note.    Message left stating reason for the f/u call, [facility] contact information, and hours of operation.    Encouraged the patient to CB if she would like to complete the triage and referral process. Nurse also mentioned the current circumstances and appreciation for her patience.    Referral closed per protocol and will await possible call-back from the patient.

## 2025-06-13 ENCOUNTER — HOSPITAL ENCOUNTER (OUTPATIENT)
Dept: ULTRASOUND IMAGING | Facility: CLINIC | Age: 45
Discharge: HOME | End: 2025-06-13
Attending: OBSTETRICS & GYNECOLOGY
Payer: COMMERCIAL

## 2025-06-13 ENCOUNTER — RESULTS FOLLOW-UP (OUTPATIENT)
Dept: OBGYN CLINIC | Facility: CLINIC | Age: 45
End: 2025-06-13

## 2025-06-13 ENCOUNTER — HOSPITAL ENCOUNTER (OUTPATIENT)
Dept: MAMMOGRAPHY | Facility: CLINIC | Age: 45
Discharge: HOME | End: 2025-06-13
Attending: OBSTETRICS & GYNECOLOGY
Payer: COMMERCIAL

## 2025-06-13 VITALS — WEIGHT: 151 LBS | BODY MASS INDEX: 28.51 KG/M2 | HEIGHT: 61 IN

## 2025-06-13 PROCEDURE — 76642 ULTRASOUND BREAST LIMITED: CPT

## 2025-06-13 PROCEDURE — 77066 DX MAMMO INCL CAD BI: CPT

## 2025-06-13 PROCEDURE — G0279 TOMOSYNTHESIS, MAMMO: HCPCS

## 2025-07-14 NOTE — PROGRESS NOTES
Annual Exam Pre-charting    Last Annual Exam: 2024    Last PAP/HPV Test and Result: 2024, PAP/HPV neg    Last Mammo Screenin2023    Last STD Culture Screenin2018    Current BC Method: none

## 2025-07-15 ENCOUNTER — ANNUAL EXAM (OUTPATIENT)
Dept: OBGYN CLINIC | Facility: CLINIC | Age: 45
End: 2025-07-15
Payer: COMMERCIAL

## 2025-07-15 VITALS
BODY MASS INDEX: 27.26 KG/M2 | SYSTOLIC BLOOD PRESSURE: 106 MMHG | HEIGHT: 61 IN | DIASTOLIC BLOOD PRESSURE: 62 MMHG | WEIGHT: 144.4 LBS

## 2025-07-15 DIAGNOSIS — Z01.419 WOMEN'S ANNUAL ROUTINE GYNECOLOGICAL EXAMINATION: Primary | ICD-10-CM

## 2025-07-15 PROCEDURE — 99396 PREV VISIT EST AGE 40-64: CPT | Performed by: OBSTETRICS & GYNECOLOGY

## 2025-07-15 NOTE — PROGRESS NOTES
"Subjective      Toyin Lee is a 44 y.o. female who presents for annual well woman exam. Periods are regular every 28-30 days, lasting 7 days. No intermenstrual bleeding, spotting, or discharge.  Headache before and during the menses also cramping also heavier bleeding   Current contraception: none  History of abnormal Pap smear: no    mgf colon ca  Maunt breast ca    Menstrual History:  OB History          3    Para   2    Term   2       0    AB   1    Living   2         SAB   1    IAB   0    Ectopic   0    Multiple        Live Births   2           Obstetric Comments   Menarche @ 13  First child @ 22              Patient's last menstrual period was 2025 (exact date).  Period Cycle (Days): 28  Period Duration (Days): 7  Period Pattern: Regular  Menstrual Flow: Moderate, Heavy  Dysmenorrhea: (!) Moderate  Dysmenorrhea Symptoms: Cramping, Headache    The following portions of the patient's history were reviewed and updated as appropriate: allergies, current medications, past family history, past medical history, past social history, past surgical history, and problem list.    Review of Systems  Review of Systems   Constitutional:  Negative for activity change, appetite change, chills, fatigue and fever.   Respiratory:  Negative for apnea, cough, chest tightness and shortness of breath.    Cardiovascular:  Negative for chest pain, palpitations and leg swelling.   Gastrointestinal:  Negative for abdominal pain, constipation, diarrhea, nausea and vomiting.   Genitourinary:  Negative for difficulty urinating, dysuria, flank pain, frequency, hematuria and urgency.   Neurological:  Negative for dizziness, seizures, syncope, light-headedness, numbness and headaches.   Psychiatric/Behavioral:  Negative for agitation and confusion.           Objective      /62 (BP Location: Left arm, Patient Position: Sitting, Cuff Size: Adult)   Ht 5' 1\" (1.549 m)   Wt 65.5 kg (144 lb 6.4 oz)   LMP 2025 " (Exact Date)   BMI 27.28 kg/m²     Physical Exam  OBGyn Exam     General:   alert and oriented, in no acute distress, alert, appears stated age, and cooperative   Heart: regular rate and rhythm, S1, S2 normal, no murmur, click, rub or gallop   Lungs: clear to auscultation bilaterally   Abdomen: soft, non-tender, without masses or organomegaly   Vulva: normal, Bartholin's, Urethra, [place]'s normal   Vagina: normal mucosa, normal discharge   Cervix: no cervical motion tenderness and no lesions   Uterus: normal size   Adnexa:  Breast Exam:  normal adnexa  breasts appear normal, no suspicious masses, no skin or nipple changes or axillary nodes.                Assessment      @well woman@ .       45 yo female  Annual exam   Prior 1 , 1 C/S  Partner genital herpes   Menstrual migraine desired expectant management  Natural planning contraception  Plan   Pap/HPV -  Diet/exercise   Calcium/vitamin-D  Mammogram  breast density recommend ABUS ultrasound  Return to office for annual exam     All questions answered.     There are no Patient Instructions on file for this visit.

## (undated) DEVICE — TELFA NON-ADHERENT ABSORBENT DRESSING: Brand: TELFA

## (undated) DEVICE — Device

## (undated) DEVICE — ADHESIVE SKN CLSR HISTOACRYL FLEX 0.5ML LF

## (undated) DEVICE — GLOVE INDICATOR PI UNDERGLOVE SZ 7 BLUE

## (undated) DEVICE — SUT VICRYL 0 CTX 36 IN J978H

## (undated) DEVICE — SUT MONOCRYL 0 CTX 36 IN Y398H

## (undated) DEVICE — CHLORAPREP HI-LITE 26ML ORANGE

## (undated) DEVICE — PACK C-SECTION PBDS

## (undated) DEVICE — GAUZE SPONGES,16 PLY: Brand: CURITY

## (undated) DEVICE — ABDOMINAL PAD: Brand: DERMACEA

## (undated) DEVICE — SKIN MARKER DUAL TIP WITH RULER CAP, FLEXIBLE RULER AND LABELS: Brand: DEVON

## (undated) DEVICE — SUT VICRYL 4-0 KS 27 IN J662H

## (undated) DEVICE — SUT PLAIN 2-0 CTX 27 IN 872H

## (undated) DEVICE — GLOVE PI ULTRA TOUCH SZ.6.5

## (undated) DEVICE — SPONGE SCRUB 4 PCT CHLORHEXIDINE